# Patient Record
Sex: MALE | Race: OTHER | Employment: OTHER | URBAN - METROPOLITAN AREA
[De-identification: names, ages, dates, MRNs, and addresses within clinical notes are randomized per-mention and may not be internally consistent; named-entity substitution may affect disease eponyms.]

---

## 2021-08-29 ENCOUNTER — HOSPITAL ENCOUNTER (INPATIENT)
Age: 81
LOS: 5 days | Discharge: HOME | DRG: 863 | End: 2021-09-03
Attending: EMERGENCY MEDICINE | Admitting: FAMILY MEDICINE
Payer: MEDICARE

## 2021-08-29 ENCOUNTER — APPOINTMENT (OUTPATIENT)
Dept: CT IMAGING | Age: 81
DRG: 863 | End: 2021-08-29
Payer: MEDICARE

## 2021-08-29 DIAGNOSIS — N30.00 ACUTE CYSTITIS WITHOUT HEMATURIA: Primary | ICD-10-CM

## 2021-08-29 DIAGNOSIS — R33.9 URINARY RETENTION: ICD-10-CM

## 2021-08-29 PROBLEM — N39.0 UTI (URINARY TRACT INFECTION): Status: ACTIVE | Noted: 2021-08-29

## 2021-08-29 LAB
ALBUMIN SERPL-MCNC: 3.6 G/DL (ref 3.5–5.2)
ALP BLD-CCNC: 97 U/L (ref 40–129)
ALT SERPL-CCNC: 12 U/L (ref 0–40)
ANION GAP SERPL CALCULATED.3IONS-SCNC: 11 MMOL/L (ref 7–16)
AST SERPL-CCNC: 13 U/L (ref 0–39)
BACTERIA: ABNORMAL /HPF
BASOPHILS ABSOLUTE: 0.03 E9/L (ref 0–0.2)
BASOPHILS RELATIVE PERCENT: 0.3 % (ref 0–2)
BILIRUB SERPL-MCNC: 0.5 MG/DL (ref 0–1.2)
BILIRUBIN URINE: NEGATIVE
BLOOD, URINE: ABNORMAL
BUN BLDV-MCNC: 29 MG/DL (ref 6–23)
CALCIUM SERPL-MCNC: 9.5 MG/DL (ref 8.6–10.2)
CHLORIDE BLD-SCNC: 105 MMOL/L (ref 98–107)
CLARITY: CLEAR
CO2: 22 MMOL/L (ref 22–29)
COLOR: YELLOW
CREAT SERPL-MCNC: 1.9 MG/DL (ref 0.7–1.2)
EOSINOPHILS ABSOLUTE: 0.08 E9/L (ref 0.05–0.5)
EOSINOPHILS RELATIVE PERCENT: 0.9 % (ref 0–6)
GFR AFRICAN AMERICAN: 41
GFR NON-AFRICAN AMERICAN: 34 ML/MIN/1.73
GLUCOSE BLD-MCNC: 166 MG/DL (ref 74–99)
GLUCOSE URINE: NEGATIVE MG/DL
HCT VFR BLD CALC: 42.8 % (ref 37–54)
HEMOGLOBIN: 14 G/DL (ref 12.5–16.5)
IMMATURE GRANULOCYTES #: 0.04 E9/L
IMMATURE GRANULOCYTES %: 0.5 % (ref 0–5)
KETONES, URINE: NEGATIVE MG/DL
LACTIC ACID: 2 MMOL/L (ref 0.5–2.2)
LEUKOCYTE ESTERASE, URINE: ABNORMAL
LIPASE: 18 U/L (ref 13–60)
LYMPHOCYTES ABSOLUTE: 0.93 E9/L (ref 1.5–4)
LYMPHOCYTES RELATIVE PERCENT: 10.8 % (ref 20–42)
MCH RBC QN AUTO: 29.4 PG (ref 26–35)
MCHC RBC AUTO-ENTMCNC: 32.7 % (ref 32–34.5)
MCV RBC AUTO: 89.9 FL (ref 80–99.9)
MONOCYTES ABSOLUTE: 0.62 E9/L (ref 0.1–0.95)
MONOCYTES RELATIVE PERCENT: 7.2 % (ref 2–12)
NEUTROPHILS ABSOLUTE: 6.92 E9/L (ref 1.8–7.3)
NEUTROPHILS RELATIVE PERCENT: 80.3 % (ref 43–80)
NITRITE, URINE: NEGATIVE
PDW BLD-RTO: 14.9 FL (ref 11.5–15)
PH UA: 6 (ref 5–9)
PLATELET # BLD: 145 E9/L (ref 130–450)
PMV BLD AUTO: 11.3 FL (ref 7–12)
POTASSIUM SERPL-SCNC: 4.5 MMOL/L (ref 3.5–5)
PROTEIN UA: 30 MG/DL
RBC # BLD: 4.76 E12/L (ref 3.8–5.8)
RBC UA: ABNORMAL /HPF (ref 0–2)
SODIUM BLD-SCNC: 138 MMOL/L (ref 132–146)
SPECIFIC GRAVITY UA: 1.02 (ref 1–1.03)
TOTAL PROTEIN: 7.5 G/DL (ref 6.4–8.3)
UROBILINOGEN, URINE: 0.2 E.U./DL
WBC # BLD: 8.6 E9/L (ref 4.5–11.5)
WBC UA: ABNORMAL /HPF (ref 0–5)

## 2021-08-29 PROCEDURE — 87088 URINE BACTERIA CULTURE: CPT

## 2021-08-29 PROCEDURE — 96375 TX/PRO/DX INJ NEW DRUG ADDON: CPT

## 2021-08-29 PROCEDURE — 74176 CT ABD & PELVIS W/O CONTRAST: CPT

## 2021-08-29 PROCEDURE — 83690 ASSAY OF LIPASE: CPT

## 2021-08-29 PROCEDURE — 83605 ASSAY OF LACTIC ACID: CPT

## 2021-08-29 PROCEDURE — 1200000000 HC SEMI PRIVATE

## 2021-08-29 PROCEDURE — 6360000002 HC RX W HCPCS: Performed by: EMERGENCY MEDICINE

## 2021-08-29 PROCEDURE — 99283 EMERGENCY DEPT VISIT LOW MDM: CPT

## 2021-08-29 PROCEDURE — 85025 COMPLETE CBC W/AUTO DIFF WBC: CPT

## 2021-08-29 PROCEDURE — 96376 TX/PRO/DX INJ SAME DRUG ADON: CPT

## 2021-08-29 PROCEDURE — 80053 COMPREHEN METABOLIC PANEL: CPT

## 2021-08-29 PROCEDURE — 87186 SC STD MICRODIL/AGAR DIL: CPT

## 2021-08-29 PROCEDURE — 96374 THER/PROPH/DIAG INJ IV PUSH: CPT

## 2021-08-29 PROCEDURE — 87077 CULTURE AEROBIC IDENTIFY: CPT

## 2021-08-29 PROCEDURE — 2580000003 HC RX 258: Performed by: EMERGENCY MEDICINE

## 2021-08-29 PROCEDURE — 81001 URINALYSIS AUTO W/SCOPE: CPT

## 2021-08-29 RX ORDER — 0.9 % SODIUM CHLORIDE 0.9 %
500 INTRAVENOUS SOLUTION INTRAVENOUS ONCE
Status: COMPLETED | OUTPATIENT
Start: 2021-08-29 | End: 2021-08-29

## 2021-08-29 RX ORDER — ONDANSETRON 2 MG/ML
4 INJECTION INTRAMUSCULAR; INTRAVENOUS ONCE
Status: COMPLETED | OUTPATIENT
Start: 2021-08-29 | End: 2021-08-29

## 2021-08-29 RX ORDER — PROMETHAZINE HYDROCHLORIDE 25 MG/ML
12.5 INJECTION, SOLUTION INTRAMUSCULAR; INTRAVENOUS ONCE
Status: DISCONTINUED | OUTPATIENT
Start: 2021-08-29 | End: 2021-09-03 | Stop reason: HOSPADM

## 2021-08-29 RX ORDER — FENTANYL CITRATE 50 UG/ML
50 INJECTION, SOLUTION INTRAMUSCULAR; INTRAVENOUS ONCE
Status: COMPLETED | OUTPATIENT
Start: 2021-08-29 | End: 2021-08-29

## 2021-08-29 RX ADMIN — FENTANYL CITRATE 50 MCG: 50 INJECTION, SOLUTION INTRAMUSCULAR; INTRAVENOUS at 17:30

## 2021-08-29 RX ADMIN — ONDANSETRON 4 MG: 2 INJECTION INTRAMUSCULAR; INTRAVENOUS at 20:09

## 2021-08-29 RX ADMIN — ONDANSETRON 4 MG: 2 INJECTION INTRAMUSCULAR; INTRAVENOUS at 17:31

## 2021-08-29 RX ADMIN — SODIUM CHLORIDE 500 ML: 9 INJECTION, SOLUTION INTRAVENOUS at 17:31

## 2021-08-29 RX ADMIN — CEFTRIAXONE 1000 MG: 1 INJECTION, POWDER, FOR SOLUTION INTRAMUSCULAR; INTRAVENOUS at 21:47

## 2021-08-29 ASSESSMENT — PAIN SCALES - GENERAL: PAINLEVEL_OUTOF10: 6

## 2021-08-29 NOTE — ED NOTES
Bed: 15  Expected date:   Expected time:   Means of arrival:   Comments:  SINGH Mansfield RN  08/29/21 7448

## 2021-08-29 NOTE — ED PROVIDER NOTES
[facility]  Department of Emergency Medicine   ED  Encounter Note  Admit Date/RoomTime: 2021  4:38 PM  ED Room: 15/15    NAME: Arturo Marina  : 1940  MRN: [id number]     Chief Complaint:  Nausea & Vomiting (since this AM and hasn ot been able to take any of this medication. )    History of Present Illness        Arturo Marina is a 78 y.o. old male who presents to the emergency department for evaluation of nausea, vomiting and abdominal pain. Patient is Yi only speaking,  phone was used. This did limit history taking somewhat. He states that symptoms began today. He has been unable to take any of his medication for his diabetes. He denies any headache or falls. No chest pain or shortness of breath. No diarrhea or constipation. His daughter did arrive and did not provide any additional history. She agreed that the patient began having abdominal pain and vomiting today. Patient just moved here from [place] 1 week ago. He does not have a family doctor in the area. .  ROS   Pertinent positives and negatives are stated within HPI, all other systems reviewed and are negative. Past Medical History:  has no past medical history on file. Surgical History:  has no past surgical history on file. Social History:      Family History: family history is not on file. Allergies: Patient has no known allergies. Physical Exam   Oxygen Saturation Interpretation: Normal.        ED Triage Vitals [21 1639]   BP Temp Temp Source Pulse Resp SpO2 Height Weight   (!) 153/91 98.2 °F (36.8 °C) Oral 69 16 98 % -- --         General Appearance/Constitutional:  Alert, development consistent with age. HEENT:  NC/NT. PERRLA. Airway patent. Neck:  Supple. No lymphadenopathy. Respiratory: Lungs Clear to auscultation and breath sounds equal.  CV:  Regular rate and rhythm. GI:  normal appearing, non-distended with no visible hernias. Bowel sounds: normal bowel sounds. Tenderness: There is moderate tenderness present - located diffusely in the entire abdomen., There is no rebound tenderness. , There is no guarding. , There is no pulsatile mass. , Abdominal distension is present - located diffusely in the lower abdomen. Liver: non-tender. Spleen:  non-tender. Back: CVA Tenderness: No CVA tenderness. Integument:  Normal turgor. Warm, dry, without visible rash, unless noted elsewhere. Neurological:  Orientation age-appropriate. Motor functions intact.     Lab / Imaging Results   (All laboratory and radiology results have been personally reviewed by myself)  Labs:  Results for orders placed or performed during the hospital encounter of 08/29/21   Comprehensive Metabolic Panel   Result Value Ref Range    Sodium 138 132 - 146 mmol/L    Potassium 4.5 3.5 - 5.0 mmol/L    Chloride 105 98 - 107 mmol/L    CO2 22 22 - 29 mmol/L    Anion Gap 11 7 - 16 mmol/L    Glucose 166 (H) 74 - 99 mg/dL    BUN 29 (H) 6 - 23 mg/dL    CREATININE 1.9 (H) 0.7 - 1.2 mg/dL    GFR Non-African American 34 >=60 mL/min/1.73    GFR African American 41     Calcium 9.5 8.6 - 10.2 mg/dL    Total Protein 7.5 6.4 - 8.3 g/dL    Albumin 3.6 3.5 - 5.2 g/dL    Total Bilirubin 0.5 0.0 - 1.2 mg/dL    Alkaline Phosphatase 97 40 - 129 U/L    ALT 12 0 - 40 U/L    AST 13 0 - 39 U/L   CBC Auto Differential   Result Value Ref Range    WBC 8.6 4.5 - 11.5 E9/L    RBC 4.76 3.80 - 5.80 E12/L    Hemoglobin 14.0 12.5 - 16.5 g/dL    Hematocrit 42.8 37.0 - 54.0 %    MCV 89.9 80.0 - 99.9 fL    MCH 29.4 26.0 - 35.0 pg    MCHC 32.7 32.0 - 34.5 %    RDW 14.9 11.5 - 15.0 fL    Platelets 727 034 - 123 E9/L    MPV 11.3 7.0 - 12.0 fL    Neutrophils % 80.3 (H) 43.0 - 80.0 %    Immature Granulocytes % 0.5 0.0 - 5.0 %    Lymphocytes % 10.8 (L) 20.0 - 42.0 %    Monocytes % 7.2 2.0 - 12.0 %    Eosinophils % 0.9 0.0 - 6.0 %    Basophils % 0.3 0.0 - 2.0 % Neutrophils Absolute 6.92 1.80 - 7.30 E9/L    Immature Granulocytes # 0.04 E9/L    Lymphocytes Absolute 0.93 (L) 1.50 - 4.00 E9/L    Monocytes Absolute 0.62 0.10 - 0.95 E9/L    Eosinophils Absolute 0.08 0.05 - 0.50 E9/L    Basophils Absolute 0.03 0.00 - 0.20 E9/L   Lipase   Result Value Ref Range    Lipase 18 13 - 60 U/L   Urinalysis   Result Value Ref Range    Color, UA Yellow Straw/Yellow    Clarity, UA Clear Clear    Glucose, Ur Negative Negative mg/dL    Bilirubin Urine Negative Negative    Ketones, Urine Negative Negative mg/dL    Specific Gravity, UA 1.020 1.005 - 1.030    Blood, Urine TRACE (A) Negative    pH, UA 6.0 5.0 - 9.0    Protein, UA 30 (A) Negative mg/dL    Urobilinogen, Urine 0.2 <2.0 E.U./dL    Nitrite, Urine Negative Negative    Leukocyte Esterase, Urine SMALL (A) Negative   Lactic Acid, Plasma   Result Value Ref Range    Lactic Acid 2.0 0.5 - 2.2 mmol/L   Microscopic Urinalysis   Result Value Ref Range    WBC, UA 2-5 0 - 5 /HPF    RBC, UA 1-3 0 - 2 /HPF    Bacteria, UA MANY (A) None Seen /HPF     Imaging: All Radiology results interpreted by Radiologist unless otherwise noted. CT ABDOMEN PELVIS WO CONTRAST Additional Contrast? None   Final Result   1. No acute intraperitoneal retroperitoneal process in the abdomen or in the   pelvis. 2, status post TURP. Abnormal chronic findings in the bladder as above   commented, correlation with cystoscopy findings recommended. Presently there   is some distention of the bladder which could be an indication for urinary   bladder retention. Please correlate clinically.              ED Course / Medical Decision Making     Medications   promethazine (PHENERGAN) injection 12.5 mg (has no administration in time range)   cefTRIAXone (ROCEPHIN) 1,000 mg in sterile water 10 mL IV syringe (has no administration in time range)   0.9 % sodium chloride infusion (has no administration in time range)   insulin lispro (HUMALOG) injection vial 0-10 Units (has no administration in time range)   glucose (GLUTOSE) 40 % oral gel 15 g (has no administration in time range)   dextrose 50 % IV solution (has no administration in time range)   glucagon (rDNA) injection 1 mg (has no administration in time range)   dextrose 5 % solution (has no administration in time range)   sodium chloride flush 0.9 % injection 5-40 mL (has no administration in time range)   sodium chloride flush 0.9 % injection 5-40 mL (has no administration in time range)   0.9 % sodium chloride infusion (has no administration in time range)   ondansetron (ZOFRAN-ODT) disintegrating tablet 4 mg (has no administration in time range)     Or   ondansetron (ZOFRAN) injection 4 mg (has no administration in time range)   acetaminophen (TYLENOL) tablet 650 mg (has no administration in time range)     Or   acetaminophen (TYLENOL) suppository 650 mg (has no administration in time range)   polyethylene glycol (GLYCOLAX) packet 17 g (has no administration in time range)   heparin (porcine) injection 5,000 Units (has no administration in time range)   morphine (PF) injection 2 mg (has no administration in time range)   busPIRone (BUSPAR) tablet 5 mg (has no administration in time range)   hydrALAZINE (APRESOLINE) injection 5 mg (has no administration in time range)   NIFEdipine (PROCARDIA XL) extended release tablet 30 mg (has no administration in time range)   0.9 % sodium chloride bolus (0 mLs IntraVENous Stopped 8/29/21 1809)   ondansetron (ZOFRAN) injection 4 mg (4 mg IntraVENous Given 8/29/21 1731)   fentaNYL (SUBLIMAZE) injection 50 mcg (50 mcg IntraVENous Given 8/29/21 1730)   ondansetron (ZOFRAN) injection 4 mg (4 mg IntraVENous Given 8/29/21 2009)   cefTRIAXone (ROCEPHIN) 1,000 mg in sterile water 10 mL IV syringe (1,000 mg IntraVENous New Bag 8/29/21 2147)        Re-Evaluations:  8/29/21      Patients condition is improving.     Consultations:             IP CONSULT TO INTERNAL MEDICINE  IP CONSULT TO

## 2021-08-30 PROBLEM — E11.29 TYPE 2 DIABETES MELLITUS WITH RENAL COMPLICATION (HCC): Status: ACTIVE | Noted: 2021-08-30

## 2021-08-30 PROBLEM — R11.2 INTRACTABLE NAUSEA AND VOMITING: Status: ACTIVE | Noted: 2021-08-30

## 2021-08-30 PROBLEM — N17.9 ACUTE KIDNEY INJURY SUPERIMPOSED ON CKD (HCC): Status: ACTIVE | Noted: 2021-08-30

## 2021-08-30 PROBLEM — N18.9 ACUTE KIDNEY INJURY SUPERIMPOSED ON CKD (HCC): Status: ACTIVE | Noted: 2021-08-30

## 2021-08-30 PROBLEM — N40.0 BPH (BENIGN PROSTATIC HYPERPLASIA): Status: ACTIVE | Noted: 2021-08-30

## 2021-08-30 PROBLEM — I10 ESSENTIAL HYPERTENSION: Status: ACTIVE | Noted: 2021-08-30

## 2021-08-30 PROBLEM — F41.9 ANXIETY: Status: ACTIVE | Noted: 2021-08-30

## 2021-08-30 PROBLEM — E86.0 DEHYDRATION, MODERATE: Status: ACTIVE | Noted: 2021-08-30

## 2021-08-30 PROBLEM — R14.0 ABDOMINAL DISTENTION: Status: ACTIVE | Noted: 2021-08-30

## 2021-08-30 LAB
ANION GAP SERPL CALCULATED.3IONS-SCNC: 12 MMOL/L (ref 7–16)
BASOPHILS ABSOLUTE: 0.02 E9/L (ref 0–0.2)
BASOPHILS RELATIVE PERCENT: 0.3 % (ref 0–2)
BUN BLDV-MCNC: 25 MG/DL (ref 6–23)
CALCIUM SERPL-MCNC: 9.3 MG/DL (ref 8.6–10.2)
CHLORIDE BLD-SCNC: 105 MMOL/L (ref 98–107)
CK MB: 5.7 NG/ML (ref 0–7.7)
CO2: 19 MMOL/L (ref 22–29)
CREAT SERPL-MCNC: 1.5 MG/DL (ref 0.7–1.2)
D DIMER: 1386 NG/ML DDU
EKG ATRIAL RATE: 50 BPM
EKG P AXIS: 34 DEGREES
EKG P-R INTERVAL: 226 MS
EKG Q-T INTERVAL: 436 MS
EKG QRS DURATION: 98 MS
EKG QTC CALCULATION (BAZETT): 397 MS
EKG R AXIS: 38 DEGREES
EKG T AXIS: 53 DEGREES
EKG VENTRICULAR RATE: 50 BPM
EOSINOPHILS ABSOLUTE: 0.12 E9/L (ref 0.05–0.5)
EOSINOPHILS RELATIVE PERCENT: 1.5 % (ref 0–6)
GFR AFRICAN AMERICAN: 54
GFR NON-AFRICAN AMERICAN: 45 ML/MIN/1.73
GLUCOSE BLD-MCNC: 97 MG/DL (ref 74–99)
HBA1C MFR BLD: 5.5 % (ref 4–5.6)
HCT VFR BLD CALC: 45.4 % (ref 37–54)
HEMOGLOBIN: 14.6 G/DL (ref 12.5–16.5)
IMMATURE GRANULOCYTES #: 0.02 E9/L
IMMATURE GRANULOCYTES %: 0.3 % (ref 0–5)
LIPASE: 16 U/L (ref 13–60)
LYMPHOCYTES ABSOLUTE: 0.98 E9/L (ref 1.5–4)
LYMPHOCYTES RELATIVE PERCENT: 12.5 % (ref 20–42)
MCH RBC QN AUTO: 29.8 PG (ref 26–35)
MCHC RBC AUTO-ENTMCNC: 32.2 % (ref 32–34.5)
MCV RBC AUTO: 92.7 FL (ref 80–99.9)
METER GLUCOSE: 101 MG/DL (ref 74–99)
METER GLUCOSE: 81 MG/DL (ref 74–99)
METER GLUCOSE: 89 MG/DL (ref 74–99)
METER GLUCOSE: 90 MG/DL (ref 74–99)
MONOCYTES ABSOLUTE: 0.58 E9/L (ref 0.1–0.95)
MONOCYTES RELATIVE PERCENT: 7.4 % (ref 2–12)
NEUTROPHILS ABSOLUTE: 6.14 E9/L (ref 1.8–7.3)
NEUTROPHILS RELATIVE PERCENT: 78 % (ref 43–80)
PDW BLD-RTO: 14.8 FL (ref 11.5–15)
PLATELET # BLD: 123 E9/L (ref 130–450)
PMV BLD AUTO: 12.1 FL (ref 7–12)
POTASSIUM REFLEX MAGNESIUM: 4.4 MMOL/L (ref 3.5–5)
PROCALCITONIN: 0.07 NG/ML (ref 0–0.08)
PROSTATE SPECIFIC ANTIGEN: 0.84 NG/ML (ref 0–4)
RBC # BLD: 4.9 E12/L (ref 3.8–5.8)
SARS-COV-2, NAAT: NOT DETECTED
SODIUM BLD-SCNC: 136 MMOL/L (ref 132–146)
TOTAL CK: 52 U/L (ref 20–200)
TROPONIN, HIGH SENSITIVITY: 14 NG/L (ref 0–11)
TROPONIN, HIGH SENSITIVITY: 15 NG/L (ref 0–11)
WBC # BLD: 7.9 E9/L (ref 4.5–11.5)

## 2021-08-30 PROCEDURE — 6370000000 HC RX 637 (ALT 250 FOR IP): Performed by: FAMILY MEDICINE

## 2021-08-30 PROCEDURE — 2580000003 HC RX 258: Performed by: FAMILY MEDICINE

## 2021-08-30 PROCEDURE — 36415 COLL VENOUS BLD VENIPUNCTURE: CPT

## 2021-08-30 PROCEDURE — 80048 BASIC METABOLIC PNL TOTAL CA: CPT

## 2021-08-30 PROCEDURE — 1200000000 HC SEMI PRIVATE

## 2021-08-30 PROCEDURE — 93005 ELECTROCARDIOGRAM TRACING: CPT | Performed by: FAMILY MEDICINE

## 2021-08-30 PROCEDURE — 85378 FIBRIN DEGRADE SEMIQUANT: CPT

## 2021-08-30 PROCEDURE — G0103 PSA SCREENING: HCPCS

## 2021-08-30 PROCEDURE — C9113 INJ PANTOPRAZOLE SODIUM, VIA: HCPCS | Performed by: FAMILY MEDICINE

## 2021-08-30 PROCEDURE — 6370000000 HC RX 637 (ALT 250 FOR IP): Performed by: NURSE PRACTITIONER

## 2021-08-30 PROCEDURE — 6360000002 HC RX W HCPCS: Performed by: FAMILY MEDICINE

## 2021-08-30 PROCEDURE — 84484 ASSAY OF TROPONIN QUANT: CPT

## 2021-08-30 PROCEDURE — 87040 BLOOD CULTURE FOR BACTERIA: CPT

## 2021-08-30 PROCEDURE — 85025 COMPLETE CBC W/AUTO DIFF WBC: CPT

## 2021-08-30 PROCEDURE — 82553 CREATINE MB FRACTION: CPT

## 2021-08-30 PROCEDURE — 83690 ASSAY OF LIPASE: CPT

## 2021-08-30 PROCEDURE — 82962 GLUCOSE BLOOD TEST: CPT

## 2021-08-30 PROCEDURE — 87635 SARS-COV-2 COVID-19 AMP PRB: CPT

## 2021-08-30 PROCEDURE — 82550 ASSAY OF CK (CPK): CPT

## 2021-08-30 PROCEDURE — 84145 PROCALCITONIN (PCT): CPT

## 2021-08-30 PROCEDURE — 83036 HEMOGLOBIN GLYCOSYLATED A1C: CPT

## 2021-08-30 RX ORDER — HEPARIN SODIUM 10000 [USP'U]/ML
5000 INJECTION, SOLUTION INTRAVENOUS; SUBCUTANEOUS EVERY 8 HOURS
Status: DISCONTINUED | OUTPATIENT
Start: 2021-08-30 | End: 2021-09-03 | Stop reason: HOSPADM

## 2021-08-30 RX ORDER — SODIUM CHLORIDE 9 MG/ML
INJECTION, SOLUTION INTRAVENOUS CONTINUOUS
Status: DISCONTINUED | OUTPATIENT
Start: 2021-08-30 | End: 2021-09-03 | Stop reason: HOSPADM

## 2021-08-30 RX ORDER — SODIUM CHLORIDE 9 MG/ML
25 INJECTION, SOLUTION INTRAVENOUS PRN
Status: DISCONTINUED | OUTPATIENT
Start: 2021-08-30 | End: 2021-09-03 | Stop reason: HOSPADM

## 2021-08-30 RX ORDER — ONDANSETRON 4 MG/1
4 TABLET, ORALLY DISINTEGRATING ORAL EVERY 8 HOURS PRN
Status: DISCONTINUED | OUTPATIENT
Start: 2021-08-30 | End: 2021-09-03 | Stop reason: HOSPADM

## 2021-08-30 RX ORDER — NICOTINE POLACRILEX 4 MG
15 LOZENGE BUCCAL PRN
Status: DISCONTINUED | OUTPATIENT
Start: 2021-08-30 | End: 2021-09-03 | Stop reason: HOSPADM

## 2021-08-30 RX ORDER — ACETAMINOPHEN 650 MG/1
650 SUPPOSITORY RECTAL EVERY 6 HOURS PRN
Status: DISCONTINUED | OUTPATIENT
Start: 2021-08-30 | End: 2021-09-03 | Stop reason: HOSPADM

## 2021-08-30 RX ORDER — ONDANSETRON 2 MG/ML
4 INJECTION INTRAMUSCULAR; INTRAVENOUS EVERY 6 HOURS PRN
Status: DISCONTINUED | OUTPATIENT
Start: 2021-08-30 | End: 2021-09-03 | Stop reason: HOSPADM

## 2021-08-30 RX ORDER — SODIUM CHLORIDE 0.9 % (FLUSH) 0.9 %
5-40 SYRINGE (ML) INJECTION EVERY 12 HOURS SCHEDULED
Status: DISCONTINUED | OUTPATIENT
Start: 2021-08-30 | End: 2021-09-03 | Stop reason: HOSPADM

## 2021-08-30 RX ORDER — ACETAMINOPHEN 325 MG/1
650 TABLET ORAL EVERY 6 HOURS PRN
Status: DISCONTINUED | OUTPATIENT
Start: 2021-08-30 | End: 2021-09-03 | Stop reason: HOSPADM

## 2021-08-30 RX ORDER — PANTOPRAZOLE SODIUM 40 MG/10ML
40 INJECTION, POWDER, LYOPHILIZED, FOR SOLUTION INTRAVENOUS DAILY
Status: DISCONTINUED | OUTPATIENT
Start: 2021-08-30 | End: 2021-09-03 | Stop reason: HOSPADM

## 2021-08-30 RX ORDER — MORPHINE SULFATE 2 MG/ML
2 INJECTION, SOLUTION INTRAMUSCULAR; INTRAVENOUS EVERY 4 HOURS PRN
Status: DISCONTINUED | OUTPATIENT
Start: 2021-08-30 | End: 2021-09-03 | Stop reason: HOSPADM

## 2021-08-30 RX ORDER — DEXTROSE MONOHYDRATE 50 MG/ML
100 INJECTION, SOLUTION INTRAVENOUS PRN
Status: DISCONTINUED | OUTPATIENT
Start: 2021-08-30 | End: 2021-09-03 | Stop reason: HOSPADM

## 2021-08-30 RX ORDER — HYDRALAZINE HYDROCHLORIDE 20 MG/ML
5 INJECTION INTRAMUSCULAR; INTRAVENOUS EVERY 6 HOURS PRN
Status: DISCONTINUED | OUTPATIENT
Start: 2021-08-30 | End: 2021-09-03 | Stop reason: HOSPADM

## 2021-08-30 RX ORDER — TAMSULOSIN HYDROCHLORIDE 0.4 MG/1
0.4 CAPSULE ORAL DAILY
Status: DISCONTINUED | OUTPATIENT
Start: 2021-08-30 | End: 2021-09-03 | Stop reason: HOSPADM

## 2021-08-30 RX ORDER — NIFEDIPINE 30 MG/1
30 TABLET, EXTENDED RELEASE ORAL DAILY
Status: DISCONTINUED | OUTPATIENT
Start: 2021-08-30 | End: 2021-08-31

## 2021-08-30 RX ORDER — BUSPIRONE HYDROCHLORIDE 5 MG/1
5 TABLET ORAL 2 TIMES DAILY
Status: DISCONTINUED | OUTPATIENT
Start: 2021-08-30 | End: 2021-09-03 | Stop reason: HOSPADM

## 2021-08-30 RX ORDER — POLYETHYLENE GLYCOL 3350 17 G/17G
17 POWDER, FOR SOLUTION ORAL DAILY PRN
Status: DISCONTINUED | OUTPATIENT
Start: 2021-08-30 | End: 2021-09-03 | Stop reason: HOSPADM

## 2021-08-30 RX ORDER — DEXTROSE MONOHYDRATE 25 G/50ML
12.5 INJECTION, SOLUTION INTRAVENOUS PRN
Status: DISCONTINUED | OUTPATIENT
Start: 2021-08-30 | End: 2021-09-03 | Stop reason: HOSPADM

## 2021-08-30 RX ORDER — SODIUM CHLORIDE 0.9 % (FLUSH) 0.9 %
5-40 SYRINGE (ML) INJECTION PRN
Status: DISCONTINUED | OUTPATIENT
Start: 2021-08-30 | End: 2021-09-03 | Stop reason: HOSPADM

## 2021-08-30 RX ADMIN — HEPARIN SODIUM 5000 UNITS: 10000 INJECTION INTRAVENOUS; SUBCUTANEOUS at 07:04

## 2021-08-30 RX ADMIN — BUSPIRONE HYDROCHLORIDE 5 MG: 5 TABLET ORAL at 22:41

## 2021-08-30 RX ADMIN — SODIUM CHLORIDE: 9 INJECTION, SOLUTION INTRAVENOUS at 14:23

## 2021-08-30 RX ADMIN — BUSPIRONE HYDROCHLORIDE 5 MG: 5 TABLET ORAL at 08:31

## 2021-08-30 RX ADMIN — HEPARIN SODIUM 5000 UNITS: 10000 INJECTION INTRAVENOUS; SUBCUTANEOUS at 22:42

## 2021-08-30 RX ADMIN — TAMSULOSIN HYDROCHLORIDE 0.4 MG: 0.4 CAPSULE ORAL at 17:32

## 2021-08-30 RX ADMIN — NIFEDIPINE 30 MG: 30 TABLET, EXTENDED RELEASE ORAL at 08:31

## 2021-08-30 RX ADMIN — WATER 1000 MG: 1 INJECTION INTRAMUSCULAR; INTRAVENOUS; SUBCUTANEOUS at 22:41

## 2021-08-30 RX ADMIN — SODIUM CHLORIDE: 9 INJECTION, SOLUTION INTRAVENOUS at 03:35

## 2021-08-30 RX ADMIN — PANTOPRAZOLE SODIUM 40 MG: 40 INJECTION, POWDER, FOR SOLUTION INTRAVENOUS at 03:35

## 2021-08-30 ASSESSMENT — PAIN SCALES - GENERAL
PAINLEVEL_OUTOF10: 0

## 2021-08-30 NOTE — CONSULTS
8/30/2021 3:33 PM  Service: Urology  Group: [facility] ([facility])    Norma Arceo  [id number]     Chief Complaint:  Pain on urination    History of Present Illness: The patient is a 78 y.o. male with a PMH of prediabetes, BPH, CKD, hypertension, gout and anxiety disorder who presents with abdominal pain, nausea and vomiting. Pt is difficult to obtain a hx from as  phone is not available. I called his daughter Kal Mobley [phone] who told me pt had a TURP in March in [place]. He as doing well until yesterday when he had pain in his \"private parts\". He was having no problems urinating. He did have a little constipation. No hematuria. He did have a de leon catheter placed for unknown amount. He currently appears comfortable and is asking to eat. This is confirmed by his daughter. Past Medical History:   Diagnosis Date    Pre-diabetes          No past surgical history on file. Medications Prior to Admission:    No medications prior to admission. Allergies:    Patient has no known allergies. Social History:    Lives with his daughter. Just moved here from [place]    Family History:   Non-contributory to this Urological problem  family history is not on file. Review of Systems:  Unable to obtain    Physical Exam:     Vitals:  BP (!) 152/74   Pulse 55   Temp 97.5 °F (36.4 °C) (Temporal)   Resp 16   Ht 5' 6\" (1.676 m)   SpO2 95%     General:  Awake, alert, conversing  HEENT:  Normocephalic, atraumatic. Pupils equal, round. No scleral icterus. No conjunctival injection. Normal lips, teeth, and gums. No nasal dis  Heart:  RRR  Lungs:  No audible wheezing. Respirations symmetric and non-labored. Abdomen:  soft, nontender, no masses, no organomegaly, no peritoneal signs  Extremities:  No clubbing, cyanosis, or edema  Skin:  Warm and dry, no open lesions or rashes  Neuro:  There are no motor or sensory deficits in the 4 quadrant extremities  Genitalia: de leon catheter noted in urethra  Labs:  Recent Labs     08/29/21  1715 08/30/21  0449   WBC 8.6 7.9   RBC 4.76 4.90   HGB 14.0 14.6   HCT 42.8 45.4   MCV 89.9 92.7   MCH 29.4 29.8   MCHC 32.7 32.2   RDW 14.9 14.8    123*   MPV 11.3 12.1*         Recent Labs     08/29/21  1715 08/30/21  0449   CREATININE 1.9* 1.5*       Results for Carola Akbar (MRN [id number]) as of 8/30/2021 15:51   Ref. Range 8/29/2021 19:10   Color, UA Latest Ref Range: Straw/Yellow  Yellow   Clarity, UA Latest Ref Range: Clear  Clear   Glucose, UA Latest Ref Range: Negative mg/dL Negative   Bilirubin, Urine Latest Ref Range: Negative  Negative   Ketones, Urine Latest Ref Range: Negative mg/dL Negative   Specific Gravity, UA Latest Ref Range: 1.005 - 1.030  1.020   Blood, Urine Latest Ref Range: Negative  TRACE (A)   pH, UA Latest Ref Range: 5.0 - 9.0  6.0   Protein, UA Latest Ref Range: Negative mg/dL 30 (A)   Urobilinogen, Urine Latest Ref Range: <2.0 E.U./dL 0.2   Nitrite, Urine Latest Ref Range: Negative  Negative   Leukocyte Esterase, Urine Latest Ref Range: Negative  SMALL (A)   WBC, UA Latest Ref Range: 0 - 5 /HPF 2-5   RBC, UA Latest Ref Range: 0 - 2 /HPF 1-3   Bacteria, UA Latest Ref Range: None Seen /HPF MANY (A)     8/30/2021 12:04 PM - Colt, Frieda Incoming Results From [facility]    Specimen Information: Urine, clean catch        Component Collected Lab   Organism Abnormal  08/29/2021  7:10 PM [facility]   Gram positive cocci    Urine Culture, Routine 08/29/2021  7:10 PM [facility]   >100,000 CFU/ml   Identification and sensitivity to follow          Assessment/plan:  BPH, REID  UTI   UR    78year old male s/p TURP who presents with abdominal pain  Nausea, vomiting. He had a de leon catheter inserted, unclear if he was in retention although the bladder looked somewhat distended on ct scan with chronic changes of REID noted. His daughter states he was voiding ok at home.   Urine + for gram positive cocci  Final culture pending  Antibiotics per primary  Start Flomax  PSA pending  Will plan VT prior to discharge  Updated daughter    Thank you  Frank RAYMOND  [facility]                  Electronically signed by Merlin Major, APRN - CNP on 8/30/2021 at 3:33 PM   Agree with above assessment and plan

## 2021-08-30 NOTE — CARE COORDINATION
Pt is Malagasy-speaking. Placed call to daughter, Bernice Erickson, to determine discharge plan. Message left.  Jan Werner -[phone]

## 2021-08-30 NOTE — ED NOTES
used, number [id number], patient verbalized understanding of medications and insertion of de leon     Genesis Zavala RN  08/29/21 2010

## 2021-08-30 NOTE — ED NOTES
Daughter Rimma Hassan called and said to call her if pt needs a ride home [phone]     Maribell Pelayo  08/29/21 5952

## 2021-08-30 NOTE — H&P
Hospital Medicine History & Physical      PCP: No primary care provider on file. Date of Admission: 8/29/2021    Date of Service: Pt seen/examined on 8/29/2021 and Admitted to Inpatient with expected LOS greater than two midnights due to medical therapy. Chief Complaint: Abdominal pain, nausea and vomiting      History Of Present Illness:      78 y.o. male who presented to [facility] with medical history of prediabetes/diabetes, BPH status post TURP, chronic kidney disease, unknown baseline, hypertension, gout and anxiety disorder. Patient is new to the area in the past 1 week, lives in [place] where he has received most of his care. Patient is a poor historian and primarily Monegasque-speaking. History provided partially by  and his daughter over the phone.  system broke down in the middle of conversation. According to patient's daughter who takes care of him, he was in his usual state of health until sometime today when he developed sudden onset of abdominal pain. Could not characterize the pain for me over the phone, states that it is constant and patient has had associated abdominal distention, nausea and vomiting. No bladder or bowel complaints. No fever noticed by then. Patient continues to throw up in the ER. Not vaccinated for Covid. Vital signs notable for blood pressure 153/91, labs showed urinalysis with 2-5 white cells, positive for bacteria, leukocyte esterase, creatinine 1.9, unknown baseline, glucose 166. CT scan of the abdomen and pelvis showed normal pancreas and gallbladder, kidney cysts, distended bladder with postop TURP changes, bladder wall thickening with several calcifications scattered to the bladder wall, bladder diverticuli and colonic diverticulosis. 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 DJD of the spine.     History taking is limited due to language barrier and malfunctioning  system. He is being admitted for further management. Past Medical History: Above    No past medical history on file. Past Surgical History: TURP in 2021    No past surgical history on file. Medications Prior to Admission: Med list reviewed over the phone, losartan 25 mg daily, allopurinol 100 mg daily, glimepiride 1 mg daily, nifedipine 30 mg daily and buspirone 5 mg twice daily. Prior to Admission medications    Not on File       Allergies:  Patient has no known allergies. Social History:      The patient currently lives at home with daughter. TOBACCO:   has no history on file for tobacco use. ETOH:   has no history on file for alcohol use. Family History:     Reviewed in detail Positive as follows: Mother  from complication of heart disease, uncle had diabetes. No family history on file. REVIEW OF SYSTEMS:   Pertinent positives as noted in the HPI. Unable to review due to language barrier. PHYSICAL EXAM:    BP (!) 148/85   Pulse 75   Temp 98.2 °F (36.8 °C) (Oral)   Resp 20   SpO2 98%     General appearance: Elderly male, ill-appearing and weak, mild painful distress, appears stated age and cooperative. Afebrile. HEENT:  Normal cephalic, atraumatic without obvious deformity. Pupils equal, round, and reactive to light. Extra ocular muscles intact. Conjunctivae/corneas clear. Neck: Supple, with full range of motion. No jugular venous distention. Trachea midline. Respiratory:  Normal respiratory effort. Clear to auscultation, bilaterally without Rales/Wheezes/Rhonchi. Cardiovascular:  Regular rate and rhythm with normal S1/S2 without murmurs, rubs or gallops. Abdomen: Soft, distended and tender with diminished bowel sounds. Musculoskeletal:  No clubbing, cyanosis or edema bilaterally. Full range of motion without deformity. Skin: Skin color, texture, turgor normal.  No rashes or lesions.   Neurologic: Cranial nerves: II-XII intact, grossly non-focal.  Psychiatric:  Alert and oriented, thought content appropriate, normal insight  Capillary Refill: Brisk,< 3 seconds   Peripheral Pulses: +2 palpable, equal bilaterally       Labs:     Recent Labs     08/29/21  1715   WBC 8.6   HGB 14.0   HCT 42.8        Recent Labs     08/29/21  1715      K 4.5      CO2 22   BUN 29*   CREATININE 1.9*   CALCIUM 9.5     Recent Labs     08/29/21  1715   AST 13   ALT 12   BILITOT 0.5   ALKPHOS 97     No results for input(s): INR in the last 72 hours. No results for input(s): Vangie Keens in the last 72 hours. Urinalysis:      Lab Results   Component Value Date    NITRU Negative 08/29/2021    WBCUA 2-5 08/29/2021    BACTERIA MANY 08/29/2021    RBCUA 1-3 08/29/2021    BLOODU TRACE 08/29/2021    SPECGRAV 1.020 08/29/2021    GLUCOSEU Negative 08/29/2021       Radiology:   Reviewed and documented    CT ABDOMEN PELVIS WO CONTRAST Additional Contrast? None   Final Result   1. No acute intraperitoneal retroperitoneal process in the abdomen or in the   pelvis. 2, status post TURP. Abnormal chronic findings in the bladder as above   commented, correlation with cystoscopy findings recommended. Presently there   is some distention of the bladder which could be an indication for urinary   bladder retention. Please correlate clinically. 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 Urinary retention with bladder calcifications  .   Abdominal pain    PLAN:  1.  UTI, IV Rocephin, urine and blood culture. 2.  Urinary retention, had Duval placed in the ER. Minimal urine retrieved. urology consult. 3.  Distended bladder with calcifications. Urology consult. 4.  Intractable nausea and vomiting secondary to ongoing intra-abdominal process. Supportive care. PPI, EKG a Covid test.  5.  RIVER on CKD, patient has baseline CKD unknown creatinine level. Suspected acute component due to intractable nausea and vomiting. Give fluids and monitor urinary output and kidney function. Hold losartan. Avoid nephrotoxic agents. 6.  Type 2 diabetes with renal complication, sliding scale coverage, monitor blood sugar. 7.  History of BPH status post TURP  8. Moderate dehydration, IV fluids  9. Anxiety disorder continue home medications. 10.  Hypertension, blood pressure is elevated, hold losartan given renal failure. Continue nifedipine. DVT Prophylaxis: Heparin  Diet: Diet NPO Exceptions are: Sips of Water with Meds  Code Status: No Order full    PT/OT Eval Status: As needed    Dispo -inpatient/MedSurg. Rebekah Fabry, MD    Thank you No primary care provider on file. for the opportunity to be involved in this patient's care.

## 2021-08-30 NOTE — PROGRESS NOTES
Hospitalist progress note    Patient:  Woody Figueredo  YOB: 1940  Date of Service: 8/30/21  MRN: [id number]   Acct:  [de-identified]   Primary Care Physician: No primary care provider on file. 8/29/2021     Patient Seen, Chart, Consults notes, Labs, Radiology studies reviewed. 78years old male patient came to emergency room because of abdominal pain nausea and vomiting patient has history of prediabetes and posterior pancreatic kidney disease. Patient was diagnosed with UTI and he was admitted to the hospital      Chief Complaint: None    Subjective     Patient speaks Pashto and I brought . There is no fever, or chills or sweating. There is no abdominal pain, change in bowel habits, or black or bloody stools, there is no upper GI bleeding, no nausea, no vomiting, or jaundice. The patient denied any chest pain, shortness of breath , palpitations, or irregular heart beats     Review of systems:    All 10 review of system were negative unless what is mentioned in the present history               PHYSICAL EXAM:  BP (!) 148/85   Pulse 75   Temp 98.2 °F (36.8 °C) (Oral)   Resp 20   Ht 5' 6\" (1.676 m)   SpO2 98%     General appearance - alert, well appearing, and in no distress   Head: atraumatic   Pupil: equal, round reactive to light   Neck: Supple, trachea is midline   Chest - clear to auscultation, no wheezes, rales or rhonchi, symmetric air entry   Distant Breath Sounds: No   Heart - S1 and S2 normal   Abdomen - soft, nontender, nondistended, no masses or organomegaly   Obese: No; Protuberant: Yes   normal without focal findings, mental status, speech normal, alert and oriented x3, CHARLIE    Integumentary - Skin color, texture, turgor normal. No rashes or lesions.    Musculoskeletal - no joint tenderness, deformity or swelling   Extremities - peripheral pulses normal, no pedal edema, no clubbing or cyanosis times 4        Review of Labs and Diagnostic Testing:         CT ABDOMEN PELVIS WO CONTRAST Additional Contrast? None    Result Date: 8/29/2021  EXAMINATION: CT OF THE ABDOMEN AND PELVIS WITHOUT CONTRAST 8/29/2021 6:35 pm TECHNIQUE: CT of the abdomen and pelvis was performed without the administration of intravenous contrast. Multiplanar reformatted images are provided for review. Dose modulation, iterative reconstruction, and/or weight based adjustment of the mA/kV was utilized to reduce the radiation dose to as low as reasonably achievable. HISTORY: ORDERING SYSTEM PROVIDED HISTORY: abdominal pain TECHNOLOGIST PROVIDED HISTORY: Reason for exam:->abdominal pain Additional Contrast?->None Decision Support Exception - unselect if not a suspected or confirmed emergency medical condition->Emergency Medical Condition (MA) What reading provider will be dictating this exam?->CRC FINDINGS: There is normal size and density for the liver, spleen. Pancreas has some degree of fat replacement. The gallbladder is normally distended. The biliary tree and pancreatic ductal systems are not dilated. Adrenals not enlarged. Kidneys have preserved size and cortical thickness. The simple cysts seen in the right kidney. Simple cysts seen in the left kidney. There is no renal calculus or obstruction. The bladder is moderate distended the, it measures 12 by 8.3 by 8 cm. The patient had previous TURP. There is thickening of the bladder wall up to 10 mm. Several calcifications are seen scattered throughout the bladder wall. One of these bladder wall calcifications appears to be in a collapse the bladder diverticula in the left lateral wall of the bladder. There is a septated appearance for areas of thickening of the bladder wall which could be infiltration by a myriad of multiple small diverticular like formation dissecting through the bladder wall, however correlation with cystoscopy findings are recommended. These are chronic findings of the bladder. Seminal vesicles symmetrical size.  There are no acute inflammatory changes in the mid mesentery fat planes, free intraperitoneal air ascites or bowel obstruction. The appendix seen and has unremarkable appearance. Scattered diverticular formations seen throughout the colon there is no signs for acute diverticulitis. Diverticulosis more severe in the sigmoid colon. Aorta and IVC appear unremarkable. There is no midline retroperitoneal adenopathy. . 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 Rosalio Hutton Please correlate clinically. Degenerative changes are seen throughout the thoracolumbar spine relate with spondylosis in moderate degree. 1.  No acute intraperitoneal retroperitoneal process in the abdomen or in the pelvis. 2, status post TURP. Abnormal chronic findings in the bladder as above commented, correlation with cystoscopy findings recommended. Presently there is some distention of the bladder which could be an indication for urinary bladder retention. Please correlate clinically.      Recent Results (from the past 24 hour(s))   Comprehensive Metabolic Panel    Collection Time: 08/29/21  5:15 PM   Result Value Ref Range    Sodium 138 132 - 146 mmol/L    Potassium 4.5 3.5 - 5.0 mmol/L    Chloride 105 98 - 107 mmol/L    CO2 22 22 - 29 mmol/L    Anion Gap 11 7 - 16 mmol/L    Glucose 166 (H) 74 - 99 mg/dL    BUN 29 (H) 6 - 23 mg/dL    CREATININE 1.9 (H) 0.7 - 1.2 mg/dL    GFR Non-African American 34 >=60 mL/min/1.73    GFR African American 41     Calcium 9.5 8.6 - 10.2 mg/dL    Total Protein 7.5 6.4 - 8.3 g/dL    Albumin 3.6 3.5 - 5.2 g/dL    Total Bilirubin 0.5 0.0 - 1.2 mg/dL    Alkaline Phosphatase 97 40 - 129 U/L    ALT 12 0 - 40 U/L    AST 13 0 - 39 U/L   CBC Auto Differential    Collection Time: 08/29/21  5:15 PM   Result Value Ref Range    WBC 8.6 4.5 - 11.5 E9/L    RBC 4.76 3.80 - 5.80 E12/L    Hemoglobin 14.0 12.5 - 16.5 g/dL    Hematocrit 42.8 37.0 - 54.0 %    MCV 89.9 80.0 - 99.9 fL    MCH 29.4 26.0 - 35.0 pg    MCHC 32.7 32.0 - 34.5 %    RDW 14.9 11.5 - 15.0 fL    Platelets 580 515 - 090 E9/L    MPV 11.3 7.0 - 12.0 fL    Neutrophils % 80.3 (H) 43.0 - 80.0 %    Immature Granulocytes % 0.5 0.0 - 5.0 %    Lymphocytes % 10.8 (L) 20.0 - 42.0 %    Monocytes % 7.2 2.0 - 12.0 %    Eosinophils % 0.9 0.0 - 6.0 %    Basophils % 0.3 0.0 - 2.0 %    Neutrophils Absolute 6.92 1.80 - 7.30 E9/L    Immature Granulocytes # 0.04 E9/L    Lymphocytes Absolute 0.93 (L) 1.50 - 4.00 E9/L    Monocytes Absolute 0.62 0.10 - 0.95 E9/L    Eosinophils Absolute 0.08 0.05 - 0.50 E9/L    Basophils Absolute 0.03 0.00 - 0.20 E9/L   Lipase    Collection Time: 08/29/21  5:15 PM   Result Value Ref Range    Lipase 18 13 - 60 U/L   Lactic Acid, Plasma    Collection Time: 08/29/21  5:15 PM   Result Value Ref Range    Lactic Acid 2.0 0.5 - 2.2 mmol/L   Urinalysis    Collection Time: 08/29/21  7:10 PM   Result Value Ref Range    Color, UA Yellow Straw/Yellow    Clarity, UA Clear Clear    Glucose, Ur Negative Negative mg/dL    Bilirubin Urine Negative Negative    Ketones, Urine Negative Negative mg/dL    Specific Gravity, UA 1.020 1.005 - 1.030    Blood, Urine TRACE (A) Negative    pH, UA 6.0 5.0 - 9.0    Protein, UA 30 (A) Negative mg/dL    Urobilinogen, Urine 0.2 <2.0 E.U./dL    Nitrite, Urine Negative Negative    Leukocyte Esterase, Urine SMALL (A) Negative   Microscopic Urinalysis    Collection Time: 08/29/21  7:10 PM   Result Value Ref Range    WBC, UA 2-5 0 - 5 /HPF    RBC, UA 1-3 0 - 2 /HPF    Bacteria, UA MANY (A) None Seen /HPF   POCT Glucose    Collection Time: 08/30/21  3:39 AM   Result Value Ref Range    Meter Glucose 101 (H) 74 - 99 mg/dL   EKG 12 Lead    Collection Time: 08/30/21  4:14 AM   Result Value Ref Range    Ventricular Rate 50 BPM    Atrial Rate 50 BPM    P-R Interval 226 ms    QRS Duration 98 ms    Q-T Interval 436 ms    QTc Calculation (Bazett) 397 ms    P Axis 34 degrees    R Axis 38 degrees    T Axis 53 degrees   Hemoglobin A1C    Collection Time: 08/30/21  4:49 AM   Result Value Ref Range    Hemoglobin A1C 5.5 4.0 - 5.6 %   Troponin    Collection Time: 08/30/21  4:49 AM   Result Value Ref Range    Troponin, High Sensitivity 15 (H) 0 - 11 ng/L   Troponin    Collection Time: 08/30/21  4:49 AM   Result Value Ref Range    Troponin, High Sensitivity 14 (H) 0 - 11 ng/L   CK    Collection Time: 08/30/21  4:49 AM   Result Value Ref Range    Total CK 52 20 - 200 U/L   D-dimer, quantitative    Collection Time: 08/30/21  4:49 AM   Result Value Ref Range    D-Dimer, Quant 1386 ng/mL DDU   CK-MB    Collection Time: 08/30/21  4:49 AM   Result Value Ref Range    CK-MB 5.7 0.0 - 7.7 ng/mL   Lipase    Collection Time: 08/30/21  4:49 AM   Result Value Ref Range    Lipase 16 13 - 60 U/L   Basic Metabolic Panel w/ Reflex to MG    Collection Time: 08/30/21  4:49 AM   Result Value Ref Range    Sodium 136 132 - 146 mmol/L    Potassium reflex Magnesium 4.4 3.5 - 5.0 mmol/L    Chloride 105 98 - 107 mmol/L    CO2 19 (L) 22 - 29 mmol/L    Anion Gap 12 7 - 16 mmol/L    Glucose 97 74 - 99 mg/dL    BUN 25 (H) 6 - 23 mg/dL    CREATININE 1.5 (H) 0.7 - 1.2 mg/dL    GFR Non-African American 45 >=60 mL/min/1.73    GFR African American 54     Calcium 9.3 8.6 - 10.2 mg/dL   CBC auto differential    Collection Time: 08/30/21  4:49 AM   Result Value Ref Range    WBC 7.9 4.5 - 11.5 E9/L    RBC 4.90 3.80 - 5.80 E12/L    Hemoglobin 14.6 12.5 - 16.5 g/dL    Hematocrit 45.4 37.0 - 54.0 %    MCV 92.7 80.0 - 99.9 fL    MCH 29.8 26.0 - 35.0 pg    MCHC 32.2 32.0 - 34.5 %    RDW 14.8 11.5 - 15.0 fL    Platelets 278 (L) 324 - 450 E9/L    MPV 12.1 (H) 7.0 - 12.0 fL    Neutrophils % 78.0 43.0 - 80.0 %    Immature Granulocytes % 0.3 0.0 - 5.0 %    Lymphocytes % 12.5 (L) 20.0 - 42.0 %    Monocytes % 7.4 2.0 - 12.0 %    Eosinophils % 1.5 0.0 - 6.0 %    Basophils % 0.3 0.0 - 2.0 %    Neutrophils Absolute 6.14 1.80 - 7.30 E9/L    Immature Granulocytes # 0.02 E9/L    Lymphocytes Absolute 0.98 (L) 1.50 - 4.00 E9/L    Monocytes Absolute 0.58 0.10 - 0.95 E9/L    Eosinophils Absolute 0.12 0.05 - 0.50 E9/L    Basophils Absolute 0.02 0.00 - 0.20 E9/L   COVID-19, Rapid    Collection Time: 08/30/21  7:00 AM    Specimen: Nasopharyngeal Swab; Nasal   Result Value Ref Range    SARS-CoV-2, NAAT Not Detected Not Detected   ]     Current Facility-Administered Medications: cefTRIAXone (ROCEPHIN) 1,000 mg in sterile water 10 mL IV syringe, 1,000 mg, IntraVENous, Q24H  0.9 % sodium chloride infusion, , IntraVENous, Continuous  insulin lispro (HUMALOG) injection vial 0-10 Units, 0-10 Units, Subcutaneous, 4x Daily WC  glucose (GLUTOSE) 40 % oral gel 15 g, 15 g, Oral, PRN  dextrose 50 % IV solution, 12.5 g, IntraVENous, PRN  glucagon (rDNA) injection 1 mg, 1 mg, IntraMUSCular, PRN  dextrose 5 % solution, 100 mL/hr, IntraVENous, PRN  sodium chloride flush 0.9 % injection 5-40 mL, 5-40 mL, IntraVENous, 2 times per day  sodium chloride flush 0.9 % injection 5-40 mL, 5-40 mL, IntraVENous, PRN  0.9 % sodium chloride infusion, 25 mL, IntraVENous, PRN  ondansetron (ZOFRAN-ODT) disintegrating tablet 4 mg, 4 mg, Oral, Q8H PRN **OR** ondansetron (ZOFRAN) injection 4 mg, 4 mg, IntraVENous, Q6H PRN  acetaminophen (TYLENOL) tablet 650 mg, 650 mg, Oral, Q6H PRN **OR** acetaminophen (TYLENOL) suppository 650 mg, 650 mg, Rectal, Q6H PRN  polyethylene glycol (GLYCOLAX) packet 17 g, 17 g, Oral, Daily PRN  heparin (porcine) injection 5,000 Units, 5,000 Units, Subcutaneous, Q8H  morphine (PF) injection 2 mg, 2 mg, IntraVENous, Q4H PRN  busPIRone (BUSPAR) tablet 5 mg, 5 mg, Oral, BID  hydrALAZINE (APRESOLINE) injection 5 mg, 5 mg, IntraVENous, Q6H PRN  NIFEdipine (PROCARDIA XL) extended release tablet 30 mg, 30 mg, Oral, Daily  pantoprazole (PROTONIX) injection 40 mg, 40 mg, IntraVENous, Daily  promethazine (PHENERGAN) injection 12.5 mg, 12.5 mg, IntraMUSCular, Once   Hospital Problems Last Modified POA    UTI (urinary tract infection) 8/29/2021 Yes    Intractable nausea and vomiting 8/30/2021 Yes    Abdominal distention 8/30/2021 Yes    Acute kidney injury superimposed on CKD ([facility] [address] 8/30/2021 Yes    Essential hypertension 8/30/2021 Yes    Anxiety 8/30/2021 Yes    Type 2 diabetes mellitus with renal complication ([facility] 2/49/2629 Yes    BPH (benign prostatic hyperplasia) 8/30/2021 Yes    Dehydration, moderate 8/30/2021 Yes                  Assessment and Plan:     UTI, continue Rocephin waiting for culture   Urine retention post TURP, consult urologist   Nausea vomiting, IV fluid   Acute of chronic kidney disease, monitor   diabetes mellitus , insulin coverage    Hypertension, well controlled holding losartan   Continue DVT &  GERD prophylaxis     Culture shows gram-positive cocci, continue Rocephin      Electronically signed by Daniela Lagunas MD on 8/30/2021 at 7:59 AM

## 2021-08-31 LAB
ANION GAP SERPL CALCULATED.3IONS-SCNC: 9 MMOL/L (ref 7–16)
BASOPHILS ABSOLUTE: 0.03 E9/L (ref 0–0.2)
BASOPHILS RELATIVE PERCENT: 0.4 % (ref 0–2)
BUN BLDV-MCNC: 22 MG/DL (ref 6–23)
CALCIUM SERPL-MCNC: 8.6 MG/DL (ref 8.6–10.2)
CHLORIDE BLD-SCNC: 107 MMOL/L (ref 98–107)
CO2: 21 MMOL/L (ref 22–29)
CREAT SERPL-MCNC: 1.8 MG/DL (ref 0.7–1.2)
EOSINOPHILS ABSOLUTE: 0.47 E9/L (ref 0.05–0.5)
EOSINOPHILS RELATIVE PERCENT: 6.3 % (ref 0–6)
GFR AFRICAN AMERICAN: 44
GFR NON-AFRICAN AMERICAN: 36 ML/MIN/1.73
GLUCOSE BLD-MCNC: 80 MG/DL (ref 74–99)
HCT VFR BLD CALC: 42.4 % (ref 37–54)
HEMOGLOBIN: 13.8 G/DL (ref 12.5–16.5)
IMMATURE GRANULOCYTES #: 0.04 E9/L
IMMATURE GRANULOCYTES %: 0.5 % (ref 0–5)
LYMPHOCYTES ABSOLUTE: 1.14 E9/L (ref 1.5–4)
LYMPHOCYTES RELATIVE PERCENT: 15.3 % (ref 20–42)
MCH RBC QN AUTO: 29.9 PG (ref 26–35)
MCHC RBC AUTO-ENTMCNC: 32.5 % (ref 32–34.5)
MCV RBC AUTO: 92 FL (ref 80–99.9)
METER GLUCOSE: 113 MG/DL (ref 74–99)
METER GLUCOSE: 117 MG/DL (ref 74–99)
METER GLUCOSE: 82 MG/DL (ref 74–99)
MONOCYTES ABSOLUTE: 0.71 E9/L (ref 0.1–0.95)
MONOCYTES RELATIVE PERCENT: 9.5 % (ref 2–12)
NEUTROPHILS ABSOLUTE: 5.07 E9/L (ref 1.8–7.3)
NEUTROPHILS RELATIVE PERCENT: 68 % (ref 43–80)
PDW BLD-RTO: 14.8 FL (ref 11.5–15)
PLATELET # BLD: 124 E9/L (ref 130–450)
PMV BLD AUTO: 11.2 FL (ref 7–12)
POTASSIUM REFLEX MAGNESIUM: 4.4 MMOL/L (ref 3.5–5)
RBC # BLD: 4.61 E12/L (ref 3.8–5.8)
SODIUM BLD-SCNC: 137 MMOL/L (ref 132–146)
URINE CULTURE, ROUTINE: NORMAL
WBC # BLD: 7.5 E9/L (ref 4.5–11.5)

## 2021-08-31 PROCEDURE — C9113 INJ PANTOPRAZOLE SODIUM, VIA: HCPCS | Performed by: FAMILY MEDICINE

## 2021-08-31 PROCEDURE — 6370000000 HC RX 637 (ALT 250 FOR IP): Performed by: FAMILY MEDICINE

## 2021-08-31 PROCEDURE — 80048 BASIC METABOLIC PNL TOTAL CA: CPT

## 2021-08-31 PROCEDURE — 6370000000 HC RX 637 (ALT 250 FOR IP): Performed by: NURSE PRACTITIONER

## 2021-08-31 PROCEDURE — 6370000000 HC RX 637 (ALT 250 FOR IP): Performed by: STUDENT IN AN ORGANIZED HEALTH CARE EDUCATION/TRAINING PROGRAM

## 2021-08-31 PROCEDURE — 1200000000 HC SEMI PRIVATE

## 2021-08-31 PROCEDURE — 82962 GLUCOSE BLOOD TEST: CPT

## 2021-08-31 PROCEDURE — 2580000003 HC RX 258: Performed by: FAMILY MEDICINE

## 2021-08-31 PROCEDURE — 6360000002 HC RX W HCPCS: Performed by: FAMILY MEDICINE

## 2021-08-31 PROCEDURE — 36415 COLL VENOUS BLD VENIPUNCTURE: CPT

## 2021-08-31 PROCEDURE — 85025 COMPLETE CBC W/AUTO DIFF WBC: CPT

## 2021-08-31 RX ORDER — NIFEDIPINE 60 MG/1
60 TABLET, EXTENDED RELEASE ORAL DAILY
Status: DISCONTINUED | OUTPATIENT
Start: 2021-08-31 | End: 2021-09-03 | Stop reason: HOSPADM

## 2021-08-31 RX ADMIN — BUSPIRONE HYDROCHLORIDE 5 MG: 5 TABLET ORAL at 08:35

## 2021-08-31 RX ADMIN — PANTOPRAZOLE SODIUM 40 MG: 40 INJECTION, POWDER, FOR SOLUTION INTRAVENOUS at 08:35

## 2021-08-31 RX ADMIN — WATER 1000 MG: 1 INJECTION INTRAMUSCULAR; INTRAVENOUS; SUBCUTANEOUS at 21:15

## 2021-08-31 RX ADMIN — TAMSULOSIN HYDROCHLORIDE 0.4 MG: 0.4 CAPSULE ORAL at 08:35

## 2021-08-31 RX ADMIN — SODIUM CHLORIDE: 9 INJECTION, SOLUTION INTRAVENOUS at 08:38

## 2021-08-31 RX ADMIN — HEPARIN SODIUM 5000 UNITS: 10000 INJECTION INTRAVENOUS; SUBCUTANEOUS at 14:19

## 2021-08-31 RX ADMIN — BUSPIRONE HYDROCHLORIDE 5 MG: 5 TABLET ORAL at 21:15

## 2021-08-31 RX ADMIN — ACETAMINOPHEN 650 MG: 325 TABLET ORAL at 08:35

## 2021-08-31 RX ADMIN — HEPARIN SODIUM 5000 UNITS: 10000 INJECTION INTRAVENOUS; SUBCUTANEOUS at 05:46

## 2021-08-31 RX ADMIN — Medication 10 ML: at 21:15

## 2021-08-31 RX ADMIN — SODIUM CHLORIDE: 9 INJECTION, SOLUTION INTRAVENOUS at 17:42

## 2021-08-31 RX ADMIN — HEPARIN SODIUM 5000 UNITS: 10000 INJECTION INTRAVENOUS; SUBCUTANEOUS at 21:15

## 2021-08-31 RX ADMIN — NIFEDIPINE 60 MG: 60 TABLET, FILM COATED, EXTENDED RELEASE ORAL at 11:06

## 2021-08-31 ASSESSMENT — PAIN DESCRIPTION - LOCATION: LOCATION: SHOULDER

## 2021-08-31 ASSESSMENT — PAIN SCALES - GENERAL
PAINLEVEL_OUTOF10: 0
PAINLEVEL_OUTOF10: 3
PAINLEVEL_OUTOF10: 5
PAINLEVEL_OUTOF10: 4

## 2021-08-31 ASSESSMENT — PAIN DESCRIPTION - ORIENTATION: ORIENTATION: RIGHT

## 2021-08-31 ASSESSMENT — PAIN DESCRIPTION - DESCRIPTORS: DESCRIPTORS: PATIENT UNABLE TO DESCRIBE

## 2021-08-31 NOTE — PROGRESS NOTES
Hospitalist Progress Note      SYNOPSIS: Patient admitted on 2021 for abd pain, nausea and vomiting suspected 2/2 UTI s/p TURP      SUBJECTIVE:  Patient seen and examined at bedside in NAD. Pt complaining of lower abd fullness, but denies any fevers, chills, nausea, vomiting, abd pain, flank pain or diarrhea. De Leon draining dilute urine. Records reviewed. Stable overnight. No other overnight issues reported. Temp (24hrs), Av.3 °F (36.3 °C), Min:97.1 °F (36.2 °C), Max:97.5 °F (36.4 °C)    DIET: ADULT DIET; Regular  CODE: Full Code    Intake/Output Summary (Last 24 hours) at 2021 1437  Last data filed at 2021 1348  Gross per 24 hour   Intake 920 ml   Output 2800 ml   Net -1880 ml       OBJECTIVE:    BP (!) 146/71   Pulse 72   Temp 97.5 °F (36.4 °C) (Temporal)   Resp 16   Ht 5' 6\" (1.676 m)   SpO2 94%     General appearance: No apparent distress, appears stated age and cooperative. HEENT:  Conjunctivae/corneas clear. Neck: Supple. No jugular venous distention. Respiratory: Clear to auscultation bilaterally, normal respiratory effort  Cardiovascular: Regular rate rhythm, normal S1-S2  Abdomen: Soft, nontender, nondistended  Musculoskeletal: No clubbing, cyanosis, no bilateral lower extremity edema. Brisk capillary refill. 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  *     PLAN:  - Cont on empiric Ceftraixone  - Cx with NGTD  - Pt with urinary retention s/p TURP  - Urology c/s noted and appreciated; following  - Cont with de leon in interim per Urology recs  - Cont with IVF and encourage PO hydration  - Monitor renal function  - Cont with Lantus and SSI, POCT glucose  - Increase Procardia  - Cont to hole Losartan in setting of RIVER on CKD  - Monitor BP  - PRN hydralazine  - DVT and GERD PPX    DISPOSITION: Med/Surg    Medications:  REVIEWED DAILY    Infusion Medications    sodium chloride 100 mL/hr at 08/31/21 0838    dextrose      sodium chloride       Scheduled Medications    NIFEdipine  60 mg Oral Daily    cefTRIAXone (ROCEPHIN) IV  1,000 mg IntraVENous Q24H    insulin lispro  0-10 Units SubCUTAneous 4x Daily WC    sodium chloride flush  5-40 mL IntraVENous 2 times per day    heparin (porcine)  5,000 Units SubCUTAneous Q8H    busPIRone  5 mg Oral BID    pantoprazole  40 mg IntraVENous Daily    tamsulosin  0.4 mg Oral Daily    promethazine  12.5 mg IntraMUSCular Once     PRN Meds: glucose, dextrose, glucagon (rDNA), dextrose, sodium chloride flush, sodium chloride, ondansetron **OR** ondansetron, acetaminophen **OR** acetaminophen, polyethylene glycol, morphine, hydrALAZINE    Labs:     Recent Labs     08/29/21 1715 08/30/21 0449 08/31/21  0719   WBC 8.6 7.9 7.5   HGB 14.0 14.6 13.8   HCT 42.8 45.4 42.4    123* 124*       Recent Labs     08/29/21 1715 08/30/21  0449 08/31/21  0719    136 137   K 4.5 4.4 4.4    105 107   CO2 22 19* 21*   BUN 29* 25* 22   CREATININE 1.9* 1.5* 1.8*   CALCIUM 9.5 9.3 8.6       Recent Labs     08/29/21 1715 08/30/21  0449   PROT 7.5  --    ALKPHOS 97  --    ALT 12  --    AST 13  --    BILITOT 0.5  --    LIPASE 18 16       No results for input(s): INR in the last 72 hours.     Recent Labs     08/30/21 0449   CKTOTAL 52       Chronic labs:    Lab Results   Component Value Date    PSA 0.84 08/30/2021    LABA1C 5.5 08/30/2021       Radiology: REVIEWED DAILY    +++++++++++++++++++++++++++++++++++++++++++++++++  Farhat Ramsey MD  [facility] [place] Physician - [address] [place] [address] - [place], [place]  +++++++++++++++++++++++++++++++++++++++++++++++++  NOTE: This report was transcribed using voice recognition software. Every effort was made to ensure accuracy; however, inadvertent computerized transcription errors may be present.

## 2021-08-31 NOTE — CARE COORDINATION
Spoke with the pt's daughter, Katarzyna Hastings, per phone. The pt resides with her and will return home when medically stable. She will be providing transportation. The pt recently moved here from [address] and has not been established with a PCP.  William Serrato [phone]

## 2021-08-31 NOTE — PROGRESS NOTES
[facility] PROGRESS NOTE                                                                       8/31/2021          SUBJECTIVE:    Afebrile  Urine clearing  Creatinine 1.8  udrine culture  Mixed stacy  psa normal  OBJECTIVE:    BP (!) 146/71   Pulse 72   Temp 97.5 °F (36.4 °C) (Temporal)   Resp 16   Ht 5' 6\" (1.676 m)   SpO2 94%     PHYSICAL EXAMINATION:  Skin: dry, without rashes  Respirations: non-labored, intact  Abdomen: soft, non-tender, non-distended      Lab Results   Component Value Date    WBC 7.5 08/31/2021    HGB 13.8 08/31/2021    HCT 42.4 08/31/2021    MCV 92.0 08/31/2021     (L) 08/31/2021       Lab Results   Component Value Date    CREATININE 1.8 (H) 08/31/2021       Lab Results   Component Value Date    PSA 0.84 08/30/2021       REVIEW OF SYSTEMS:    CONSTITUTIONAL: negative  HEENT: negative  HEMATOLOGIC: negative  ENDOCRINE: negative  RESPIRATORY: negative  CV: negative  GI: negative  NEURO: negative  ORTHOPEDICS: negative  PSYCHIATRIC: negative  : as above    PAST FAMILY HISTORY:  No family history on file.   PAST SOCIAL HISTORY:    Social History     Socioeconomic History    Marital status:      Spouse name: Not on file    Number of children: Not on file    Years of education: Not on file    Highest education level: Not on file   Occupational History    Not on file   Tobacco Use    Smoking status: Not on file   Substance and Sexual Activity    Alcohol use: Not on file    Drug use: Not on file    Sexual activity: Not on file   Other Topics Concern    Not on file   Social History Narrative    Not on file     Social Determinants of Health     Financial Resource Strain:     Difficulty of Paying Living Expenses:    Food Insecurity:     Worried About Running Out of Food in the Last Year:     [address] in the Last Year:    Transportation Needs:     Lack of Transportation (Medical):      Lack of Transportation (Non-Medical):    Physical Activity:     Days of Exercise per Week:     Minutes of Exercise per Session:    Stress:     Feeling of Stress :    Social Connections:     Frequency of Communication with Friends and Family:     Frequency of Social Gatherings with Friends and Family:     Attends [employer] Services:     Active Member of Clubs or Organizations:     Attends Club or Organization Meetings:     Marital Status:    Intimate Partner Violence:     Fear of Current or Ex-Partner:     Emotionally Abused:     Physically Abused:     Sexually Abused:        Scheduled Meds:   NIFEdipine  60 mg Oral Daily    cefTRIAXone (ROCEPHIN) IV  1,000 mg IntraVENous Q24H    insulin lispro  0-10 Units SubCUTAneous 4x Daily WC    sodium chloride flush  5-40 mL IntraVENous 2 times per day    heparin (porcine)  5,000 Units SubCUTAneous Q8H    busPIRone  5 mg Oral BID    pantoprazole  40 mg IntraVENous Daily    tamsulosin  0.4 mg Oral Daily    promethazine  12.5 mg IntraMUSCular Once     Continuous Infusions:   sodium chloride 100 mL/hr at 08/31/21 1996    dextrose      sodium chloride       PRN Meds:.glucose, dextrose, glucagon (rDNA), dextrose, sodium chloride flush, sodium chloride, ondansetron **OR** ondansetron, acetaminophen **OR** acetaminophen, polyethylene glycol, morphine, hydrALAZINE    ASSESSMENT:    Patient Active Problem List   Diagnosis    UTI (urinary tract infection)    Intractable nausea and vomiting    Abdominal distention    Acute kidney injury superimposed on CKD ([facility]    Essential hypertension    Anxiety    Type 2 diabetes mellitus with renal complication ([facility]    BPH (benign prostatic hyperplasia)    Dehydration, moderate       PLAN:  Continue with de leon  TOV when stable        Daphizully Mora MD, M.D.  8/31/2021  1:56 PM

## 2021-09-01 ENCOUNTER — APPOINTMENT (OUTPATIENT)
Dept: GENERAL RADIOLOGY | Age: 81
DRG: 863 | End: 2021-09-01
Payer: MEDICARE

## 2021-09-01 ENCOUNTER — APPOINTMENT (OUTPATIENT)
Dept: CT IMAGING | Age: 81
DRG: 863 | End: 2021-09-01
Payer: MEDICARE

## 2021-09-01 LAB
ANION GAP SERPL CALCULATED.3IONS-SCNC: 9 MMOL/L (ref 7–16)
BASOPHILS ABSOLUTE: 0.05 E9/L (ref 0–0.2)
BASOPHILS RELATIVE PERCENT: 0.5 % (ref 0–2)
BUN BLDV-MCNC: 25 MG/DL (ref 6–23)
CALCIUM SERPL-MCNC: 8.5 MG/DL (ref 8.6–10.2)
CHLORIDE BLD-SCNC: 108 MMOL/L (ref 98–107)
CO2: 20 MMOL/L (ref 22–29)
CREAT SERPL-MCNC: 1.7 MG/DL (ref 0.7–1.2)
EOSINOPHILS ABSOLUTE: 0.48 E9/L (ref 0.05–0.5)
EOSINOPHILS RELATIVE PERCENT: 4.7 % (ref 0–6)
GFR AFRICAN AMERICAN: 47
GFR NON-AFRICAN AMERICAN: 39 ML/MIN/1.73
GLUCOSE BLD-MCNC: 122 MG/DL (ref 74–99)
HCT VFR BLD CALC: 44.4 % (ref 37–54)
HEMOGLOBIN: 14.2 G/DL (ref 12.5–16.5)
IMMATURE GRANULOCYTES #: 0.06 E9/L
IMMATURE GRANULOCYTES %: 0.6 % (ref 0–5)
LYMPHOCYTES ABSOLUTE: 0.9 E9/L (ref 1.5–4)
LYMPHOCYTES RELATIVE PERCENT: 8.8 % (ref 20–42)
MCH RBC QN AUTO: 29.7 PG (ref 26–35)
MCHC RBC AUTO-ENTMCNC: 32 % (ref 32–34.5)
MCV RBC AUTO: 92.9 FL (ref 80–99.9)
METER GLUCOSE: 120 MG/DL (ref 74–99)
METER GLUCOSE: 123 MG/DL (ref 74–99)
METER GLUCOSE: 144 MG/DL (ref 74–99)
MONOCYTES ABSOLUTE: 0.85 E9/L (ref 0.1–0.95)
MONOCYTES RELATIVE PERCENT: 8.3 % (ref 2–12)
NEUTROPHILS ABSOLUTE: 7.94 E9/L (ref 1.8–7.3)
NEUTROPHILS RELATIVE PERCENT: 77.1 % (ref 43–80)
PDW BLD-RTO: 14.4 FL (ref 11.5–15)
PLATELET # BLD: 126 E9/L (ref 130–450)
PMV BLD AUTO: 11.4 FL (ref 7–12)
POTASSIUM REFLEX MAGNESIUM: 4.2 MMOL/L (ref 3.5–5)
RBC # BLD: 4.78 E12/L (ref 3.8–5.8)
SODIUM BLD-SCNC: 137 MMOL/L (ref 132–146)
WBC # BLD: 10.3 E9/L (ref 4.5–11.5)

## 2021-09-01 PROCEDURE — 72125 CT NECK SPINE W/O DYE: CPT

## 2021-09-01 PROCEDURE — 36415 COLL VENOUS BLD VENIPUNCTURE: CPT

## 2021-09-01 PROCEDURE — 1200000000 HC SEMI PRIVATE

## 2021-09-01 PROCEDURE — 6360000002 HC RX W HCPCS: Performed by: FAMILY MEDICINE

## 2021-09-01 PROCEDURE — C9113 INJ PANTOPRAZOLE SODIUM, VIA: HCPCS | Performed by: FAMILY MEDICINE

## 2021-09-01 PROCEDURE — 6370000000 HC RX 637 (ALT 250 FOR IP): Performed by: NURSE PRACTITIONER

## 2021-09-01 PROCEDURE — 82962 GLUCOSE BLOOD TEST: CPT

## 2021-09-01 PROCEDURE — 73200 CT UPPER EXTREMITY W/O DYE: CPT

## 2021-09-01 PROCEDURE — 73030 X-RAY EXAM OF SHOULDER: CPT

## 2021-09-01 PROCEDURE — 2580000003 HC RX 258: Performed by: FAMILY MEDICINE

## 2021-09-01 PROCEDURE — 85025 COMPLETE CBC W/AUTO DIFF WBC: CPT

## 2021-09-01 PROCEDURE — 6370000000 HC RX 637 (ALT 250 FOR IP): Performed by: STUDENT IN AN ORGANIZED HEALTH CARE EDUCATION/TRAINING PROGRAM

## 2021-09-01 PROCEDURE — 80048 BASIC METABOLIC PNL TOTAL CA: CPT

## 2021-09-01 PROCEDURE — 6370000000 HC RX 637 (ALT 250 FOR IP): Performed by: FAMILY MEDICINE

## 2021-09-01 RX ORDER — OXYCODONE HYDROCHLORIDE AND ACETAMINOPHEN 5; 325 MG/1; MG/1
1 TABLET ORAL EVERY 6 HOURS PRN
Status: DISCONTINUED | OUTPATIENT
Start: 2021-09-01 | End: 2021-09-03 | Stop reason: HOSPADM

## 2021-09-01 RX ADMIN — BUSPIRONE HYDROCHLORIDE 5 MG: 5 TABLET ORAL at 08:44

## 2021-09-01 RX ADMIN — WATER 1000 MG: 1 INJECTION INTRAMUSCULAR; INTRAVENOUS; SUBCUTANEOUS at 22:58

## 2021-09-01 RX ADMIN — MORPHINE SULFATE 2 MG: 2 INJECTION, SOLUTION INTRAMUSCULAR; INTRAVENOUS at 06:33

## 2021-09-01 RX ADMIN — TAMSULOSIN HYDROCHLORIDE 0.4 MG: 0.4 CAPSULE ORAL at 08:43

## 2021-09-01 RX ADMIN — Medication 10 ML: at 09:05

## 2021-09-01 RX ADMIN — HEPARIN SODIUM 5000 UNITS: 10000 INJECTION INTRAVENOUS; SUBCUTANEOUS at 06:28

## 2021-09-01 RX ADMIN — OXYCODONE AND ACETAMINOPHEN 1 TABLET: 5; 325 TABLET ORAL at 08:47

## 2021-09-01 RX ADMIN — MORPHINE SULFATE 2 MG: 2 INJECTION, SOLUTION INTRAMUSCULAR; INTRAVENOUS at 12:31

## 2021-09-01 RX ADMIN — PANTOPRAZOLE SODIUM 40 MG: 40 INJECTION, POWDER, FOR SOLUTION INTRAVENOUS at 08:43

## 2021-09-01 RX ADMIN — BUSPIRONE HYDROCHLORIDE 5 MG: 5 TABLET ORAL at 22:53

## 2021-09-01 RX ADMIN — Medication 10 ML: at 22:59

## 2021-09-01 RX ADMIN — OXYCODONE AND ACETAMINOPHEN 1 TABLET: 5; 325 TABLET ORAL at 22:53

## 2021-09-01 RX ADMIN — NIFEDIPINE 60 MG: 60 TABLET, FILM COATED, EXTENDED RELEASE ORAL at 08:44

## 2021-09-01 RX ADMIN — HEPARIN SODIUM 5000 UNITS: 10000 INJECTION INTRAVENOUS; SUBCUTANEOUS at 22:56

## 2021-09-01 ASSESSMENT — PAIN SCALES - GENERAL
PAINLEVEL_OUTOF10: 3
PAINLEVEL_OUTOF10: 3
PAINLEVEL_OUTOF10: 8
PAINLEVEL_OUTOF10: 3
PAINLEVEL_OUTOF10: 9
PAINLEVEL_OUTOF10: 0
PAINLEVEL_OUTOF10: 10
PAINLEVEL_OUTOF10: 9
PAINLEVEL_OUTOF10: 0
PAINLEVEL_OUTOF10: 0

## 2021-09-01 ASSESSMENT — PAIN DESCRIPTION - LOCATION: LOCATION: SHOULDER

## 2021-09-01 ASSESSMENT — PAIN DESCRIPTION - FREQUENCY: FREQUENCY: INTERMITTENT

## 2021-09-01 ASSESSMENT — PAIN DESCRIPTION - DESCRIPTORS: DESCRIPTORS: PATIENT UNABLE TO DESCRIBE

## 2021-09-01 ASSESSMENT — PAIN DESCRIPTION - ORIENTATION: ORIENTATION: LEFT

## 2021-09-01 NOTE — PLAN OF CARE
Problem: Skin Integrity:  Goal: Will show no infection signs and symptoms  Description: Will show no infection signs and symptoms  Outcome: Ongoing     Problem: Skin Integrity:  Goal: Absence of new skin breakdown  Description: Absence of new skin breakdown  Outcome: Ongoing     Problem: Falls - Risk of:  Goal: Will remain free from falls  Description: Will remain free from falls  Outcome: Ongoing     Problem: Falls - Risk of:  Goal: Absence of physical injury  Description: Absence of physical injury  Outcome: Ongoing     Problem: Pain:  Goal: Pain level will decrease  Description: Pain level will decrease  Outcome: Ongoing

## 2021-09-01 NOTE — CARE COORDINATION
De Leon to be removed today and have voiding trail. If unable to void , de leon to be reinserted. Off unit for left shoulder Xray. Spoke with daughter to update. Patient lives with daughter and has a walker and wheelchair in the home. Daughter does not anticipate any additional needs. He uses [facility] on [address]. And needs a PCP arranged. CM/SW will continue to follow.

## 2021-09-01 NOTE — PROGRESS NOTES
Hospitalist Progress Note      SYNOPSIS: Patient admitted on 2021 for abd pain, nausea and vomiting suspected 2/2 UTI s/p TURP    SUBJECTIVE:  Patient seen and examined at bedside in moderate distress 2/2 new onset severe left shoulder pain which started overnight. Pt denies falling or sustaining any trauma. He is endorsing severe pain while trying to abduct, ROM is severely limited 2/2 pain. XR left shoulder without acute process. Pt otherwise denies any fevers, chills, nausea, vomiting, abd pain, flank pain or diarrhea. De Leon draining dilute urine. Plan to attempt de leon removal with TOV today per Urology recs. Records reviewed. No other overnight issues reported. Temp (24hrs), Av.7 °F (36.5 °C), Min:97.3 °F (36.3 °C), Max:98.3 °F (36.8 °C)    DIET: ADULT DIET; Regular  CODE: Full Code    Intake/Output Summary (Last 24 hours) at 2021 1325  Last data filed at 2021 0820  Gross per 24 hour   Intake 1532 ml   Output 3700 ml   Net -2168 ml     OBJECTIVE:  BP (!) 150/70   Pulse 76   Temp 98.3 °F (36.8 °C) (Temporal)   Resp 16   Ht 5' 6\" (1.676 m)   SpO2 96%   General appearance: Moderate distress, appears stated age and cooperative. HEENT:  Conjunctivae/corneas clear. Neck: Supple. No jugular venous distention. Respiratory: Clear to auscultation bilaterally, normal respiratory effort  Cardiovascular: Regular rate rhythm, normal S1-S2  Abdomen: Soft, nontender, non-distended. De Leon in place draining dilute urine. Musculoskeletal: No clubbing, cyanosis, no bilateral lower extremity edema. Brisk capillary refill. Limited left shoulder ROM, severe pain on minimal abduction of left arm. Point tenderness over anterior shoulder on palpation.   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  *     PLAN:  - Cont on empiric Ceftraixone  - Cx with NGTD  - Pt with urinary retention s/p TURP  - Urology c/s noted and appreciated; following  - Remove de leon today with TOV per Urology recs  - Cont with IVF and encourage PO hydration  - Monitor renal function  - Cont with Lantus and SSI, POCT glucose  - Titrate BP meds, Procardia 60mg PO QD and Hydralazine PRN  - Cont to hold Losartan in setting of RIVER on CKD; however renal function appears back to baseline  - Monitor BP  - Left shoulder XR without acute findings or fractures  - Pt endorsing significant left shoulder pain with decreased ROM, no reported on noted trauma/falls  - Consult Orthopedic Surgery  - DVT and GERD PPX    DISPOSITION: Med/Surg    Medications:  REVIEWED DAILY    Infusion Medications    sodium chloride 100 mL/hr at 08/31/21 1742    dextrose      sodium chloride       Scheduled Medications    NIFEdipine  60 mg Oral Daily    cefTRIAXone (ROCEPHIN) IV  1,000 mg IntraVENous Q24H    insulin lispro  0-10 Units SubCUTAneous 4x Daily WC    sodium chloride flush  5-40 mL IntraVENous 2 times per day    heparin (porcine)  5,000 Units SubCUTAneous Q8H    busPIRone  5 mg Oral BID    pantoprazole  40 mg IntraVENous Daily    tamsulosin  0.4 mg Oral Daily    promethazine  12.5 mg IntraMUSCular Once     PRN Meds: oxyCODONE-acetaminophen, glucose, dextrose, glucagon (rDNA), dextrose, sodium chloride flush, sodium chloride, ondansetron **OR** ondansetron, acetaminophen **OR** acetaminophen, polyethylene glycol, morphine, hydrALAZINE    Labs:     Recent Labs     08/30/21  0449 08/31/21  0719 09/01/21  0504   WBC 7.9 7.5 10.3   HGB 14.6 13.8 14.2   HCT 45.4 42.4 44.4   * 124* 126*       Recent Labs     08/30/21  0441 08/31/21  0719 09/01/21  0504    137 137   K 4.4 4.4 4.2    107 108*   CO2 19* 21* 20*   BUN 25* 22 25*   CREATININE 1.5* 1.8* 1.7*   CALCIUM 9.3 8.6 8.5*       Recent Labs     08/29/21  1715 08/30/21  0449   PROT 7.5  --    ALKPHOS 97  --    ALT 12  --    AST 13  --    BILITOT 0.5  --    LIPASE 18 16       No results for input(s): INR in the last 72 hours. Recent Labs     08/30/21  0449   CKTOTAL 52       Chronic labs:    Lab Results   Component Value Date    PSA 0.84 08/30/2021    LABA1C 5.5 08/30/2021       Radiology: REVIEWED DAILY    +++++++++++++++++++++++++++++++++++++++++++++++++  Luca Velasco MD  [employer] - [address] [place], [place]  +++++++++++++++++++++++++++++++++++++++++++++++++  NOTE: This report was transcribed using voice recognition software. Every effort was made to ensure accuracy; however, inadvertent computerized transcription errors may be present.

## 2021-09-01 NOTE — PROGRESS NOTES
Examined patient and discussed exam findings with attending. Formal consult note to follow.     Plan:  CT cervical spine WO contrast  CT shoulder WO contrast    Electronically signed by Megan Christensen DO on 9/1/2021 at 5:02 PM

## 2021-09-02 ENCOUNTER — APPOINTMENT (OUTPATIENT)
Dept: GENERAL RADIOLOGY | Age: 81
DRG: 863 | End: 2021-09-02
Payer: MEDICARE

## 2021-09-02 LAB
ANION GAP SERPL CALCULATED.3IONS-SCNC: 12 MMOL/L (ref 7–16)
BASOPHILS ABSOLUTE: 0.02 E9/L (ref 0–0.2)
BASOPHILS RELATIVE PERCENT: 0.2 % (ref 0–2)
BUN BLDV-MCNC: 20 MG/DL (ref 6–23)
C-REACTIVE PROTEIN: 13.7 MG/DL (ref 0–0.4)
CALCIUM SERPL-MCNC: 8.5 MG/DL (ref 8.6–10.2)
CHLORIDE BLD-SCNC: 102 MMOL/L (ref 98–107)
CO2: 19 MMOL/L (ref 22–29)
CREAT SERPL-MCNC: 1.7 MG/DL (ref 0.7–1.2)
EOSINOPHILS ABSOLUTE: 0.34 E9/L (ref 0.05–0.5)
EOSINOPHILS RELATIVE PERCENT: 3.5 % (ref 0–6)
GFR AFRICAN AMERICAN: 47
GFR NON-AFRICAN AMERICAN: 39 ML/MIN/1.73
GLUCOSE BLD-MCNC: 110 MG/DL (ref 74–99)
HCT VFR BLD CALC: 41.3 % (ref 37–54)
HEMOGLOBIN: 13.7 G/DL (ref 12.5–16.5)
IMMATURE GRANULOCYTES #: 0.1 E9/L
IMMATURE GRANULOCYTES %: 1 % (ref 0–5)
LYMPHOCYTES ABSOLUTE: 1 E9/L (ref 1.5–4)
LYMPHOCYTES RELATIVE PERCENT: 10.3 % (ref 20–42)
MCH RBC QN AUTO: 29.7 PG (ref 26–35)
MCHC RBC AUTO-ENTMCNC: 33.2 % (ref 32–34.5)
MCV RBC AUTO: 89.4 FL (ref 80–99.9)
METER GLUCOSE: 116 MG/DL (ref 74–99)
METER GLUCOSE: 127 MG/DL (ref 74–99)
METER GLUCOSE: 139 MG/DL (ref 74–99)
METER GLUCOSE: 146 MG/DL (ref 74–99)
MONOCYTES ABSOLUTE: 1.1 E9/L (ref 0.1–0.95)
MONOCYTES RELATIVE PERCENT: 11.3 % (ref 2–12)
NEUTROPHILS ABSOLUTE: 7.19 E9/L (ref 1.8–7.3)
NEUTROPHILS RELATIVE PERCENT: 73.7 % (ref 43–80)
PDW BLD-RTO: 14.8 FL (ref 11.5–15)
PLATELET # BLD: 114 E9/L (ref 130–450)
PMV BLD AUTO: 11.6 FL (ref 7–12)
POTASSIUM REFLEX MAGNESIUM: 4.2 MMOL/L (ref 3.5–5)
RBC # BLD: 4.62 E12/L (ref 3.8–5.8)
SEDIMENTATION RATE, ERYTHROCYTE: 25 MM/HR (ref 0–15)
SODIUM BLD-SCNC: 133 MMOL/L (ref 132–146)
WBC # BLD: 9.8 E9/L (ref 4.5–11.5)

## 2021-09-02 PROCEDURE — 85025 COMPLETE CBC W/AUTO DIFF WBC: CPT

## 2021-09-02 PROCEDURE — 6370000000 HC RX 637 (ALT 250 FOR IP): Performed by: FAMILY MEDICINE

## 2021-09-02 PROCEDURE — 2580000003 HC RX 258: Performed by: FAMILY MEDICINE

## 2021-09-02 PROCEDURE — C9113 INJ PANTOPRAZOLE SODIUM, VIA: HCPCS | Performed by: FAMILY MEDICINE

## 2021-09-02 PROCEDURE — 6370000000 HC RX 637 (ALT 250 FOR IP): Performed by: STUDENT IN AN ORGANIZED HEALTH CARE EDUCATION/TRAINING PROGRAM

## 2021-09-02 PROCEDURE — 82962 GLUCOSE BLOOD TEST: CPT

## 2021-09-02 PROCEDURE — 80048 BASIC METABOLIC PNL TOTAL CA: CPT

## 2021-09-02 PROCEDURE — 86140 C-REACTIVE PROTEIN: CPT

## 2021-09-02 PROCEDURE — 1200000000 HC SEMI PRIVATE

## 2021-09-02 PROCEDURE — 73630 X-RAY EXAM OF FOOT: CPT

## 2021-09-02 PROCEDURE — 85651 RBC SED RATE NONAUTOMATED: CPT

## 2021-09-02 PROCEDURE — 6360000002 HC RX W HCPCS: Performed by: FAMILY MEDICINE

## 2021-09-02 PROCEDURE — 36415 COLL VENOUS BLD VENIPUNCTURE: CPT

## 2021-09-02 PROCEDURE — 6370000000 HC RX 637 (ALT 250 FOR IP): Performed by: NURSE PRACTITIONER

## 2021-09-02 RX ADMIN — SODIUM CHLORIDE: 9 INJECTION, SOLUTION INTRAVENOUS at 22:06

## 2021-09-02 RX ADMIN — OXYCODONE AND ACETAMINOPHEN 1 TABLET: 5; 325 TABLET ORAL at 13:17

## 2021-09-02 RX ADMIN — Medication 10 ML: at 08:59

## 2021-09-02 RX ADMIN — HEPARIN SODIUM 5000 UNITS: 10000 INJECTION INTRAVENOUS; SUBCUTANEOUS at 06:05

## 2021-09-02 RX ADMIN — HEPARIN SODIUM 5000 UNITS: 10000 INJECTION INTRAVENOUS; SUBCUTANEOUS at 22:03

## 2021-09-02 RX ADMIN — NIFEDIPINE 60 MG: 60 TABLET, FILM COATED, EXTENDED RELEASE ORAL at 08:58

## 2021-09-02 RX ADMIN — HEPARIN SODIUM 5000 UNITS: 10000 INJECTION INTRAVENOUS; SUBCUTANEOUS at 13:18

## 2021-09-02 RX ADMIN — MORPHINE SULFATE 2 MG: 2 INJECTION, SOLUTION INTRAMUSCULAR; INTRAVENOUS at 08:58

## 2021-09-02 RX ADMIN — PANTOPRAZOLE SODIUM 40 MG: 40 INJECTION, POWDER, FOR SOLUTION INTRAVENOUS at 08:58

## 2021-09-02 RX ADMIN — Medication 10 ML: at 20:22

## 2021-09-02 RX ADMIN — BUSPIRONE HYDROCHLORIDE 5 MG: 5 TABLET ORAL at 08:58

## 2021-09-02 RX ADMIN — BUSPIRONE HYDROCHLORIDE 5 MG: 5 TABLET ORAL at 20:37

## 2021-09-02 RX ADMIN — WATER 1000 MG: 1 INJECTION INTRAMUSCULAR; INTRAVENOUS; SUBCUTANEOUS at 20:22

## 2021-09-02 RX ADMIN — ACETAMINOPHEN 650 MG: 325 TABLET ORAL at 08:58

## 2021-09-02 RX ADMIN — TAMSULOSIN HYDROCHLORIDE 0.4 MG: 0.4 CAPSULE ORAL at 08:58

## 2021-09-02 ASSESSMENT — PAIN SCALES - GENERAL
PAINLEVEL_OUTOF10: 3
PAINLEVEL_OUTOF10: 4
PAINLEVEL_OUTOF10: 8
PAINLEVEL_OUTOF10: 0
PAINLEVEL_OUTOF10: 8

## 2021-09-02 NOTE — PROGRESS NOTES
Hospitalist Progress Note      SYNOPSIS: Patient admitted on 2021 for abd pain, nausea and vomiting suspected 2/2 UTI s/p TURP    SUBJECTIVE:  Patient seen and examined at bedside in moderate distress 2/2 continued left shoulder pain and now right ankle pain. Pt denies falling or sustaining any trauma. ROM remains limited 2/2 pain. Pt otherwise denies any fevers, chills, nausea, vomiting, abd pain, flank pain or diarrhea. Duval removed and pt passed TOV. Records reviewed. No other overnight issues reported. Temp (24hrs), Av.1 °F (36.7 °C), Min:97.8 °F (36.6 °C), Max:98.7 °F (37.1 °C)    DIET: ADULT DIET; Regular  CODE: Full Code    Intake/Output Summary (Last 24 hours) at 2021 1343  Last data filed at 2021 1664  Gross per 24 hour   Intake 10 ml   Output 1350 ml   Net -1340 ml     OBJECTIVE:  BP (!) 149/69   Pulse 86   Temp 98.7 °F (37.1 °C) (Temporal)   Resp 16   Ht 5' 6\" (1.676 m)   SpO2 95%     General appearance: Moderate distress, appears stated age and cooperative. HEENT:  Conjunctivae/corneas clear. Neck: Supple. No jugular venous distention. Respiratory: Clear to auscultation bilaterally, normal respiratory effort  Cardiovascular: Regular rate rhythm, normal S1-S2  Abdomen: Soft, nontender, non-distended. Musculoskeletal: No clubbing, cyanosis, no bilateral lower extremity edema. Brisk capillary refill. Limited left shoulder and right ankle pain on passive soy active ROM. Point tenderness over anterior shoulder and right ankle, worse with manipulation and palption. No erythema noted, mild swelling of right ankle.   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 *     PLAN:  - Pt with urinary retention s/p TURP, de leon now removed and pt passed TOV  - Cont on empiric Ceftriaxone, transition to PO at discharge if still indicated  - Cx with NGTD  - Urology c/s noted and appreciated; following  - Decrease IVF and encourage PO hydration  - Monitor renal function  - Cont with Lantus and SSI, POCT glucose  - Titrate BP meds, Procardia 60mg PO QD and Hydralazine PRN  - Cont to hold Losartan in setting of RIVER on CKD; however renal function appears back to baseline  - Monitor BP  - Ortho c/s noted and appreciated; work-up for left shoulder and right ankle pain.   - Inflammatory markers and imaging order by ortho, cont to follow  - DVT and GERD PPX    DISPOSITION: Med/Surg    Medications:  REVIEWED DAILY    Infusion Medications    sodium chloride 100 mL/hr at 08/31/21 1742    dextrose      sodium chloride       Scheduled Medications    NIFEdipine  60 mg Oral Daily    cefTRIAXone (ROCEPHIN) IV  1,000 mg IntraVENous Q24H    insulin lispro  0-10 Units SubCUTAneous 4x Daily WC    sodium chloride flush  5-40 mL IntraVENous 2 times per day    heparin (porcine)  5,000 Units SubCUTAneous Q8H    busPIRone  5 mg Oral BID    pantoprazole  40 mg IntraVENous Daily    tamsulosin  0.4 mg Oral Daily    promethazine  12.5 mg IntraMUSCular Once     PRN Meds: oxyCODONE-acetaminophen, glucose, dextrose, glucagon (rDNA), dextrose, sodium chloride flush, sodium chloride, ondansetron **OR** ondansetron, acetaminophen **OR** acetaminophen, polyethylene glycol, morphine, hydrALAZINE    Labs:     Recent Labs     08/31/21  0719 09/01/21  0504 09/02/21  0622   WBC 7.5 10.3 9.8   HGB 13.8 14.2 13.7   HCT 42.4 44.4 41.3   * 126* 114*       Recent Labs     08/31/21  0719 09/01/21  0504 09/02/21  0622    137 133   K 4.4 4.2 4.2    108* 102   CO2 21* 20* 19*   BUN 22 25* 20

## 2021-09-02 NOTE — PROGRESS NOTES
Department of Orthopedic Surgery  Resident Progress Note    Patient seen and examined. Still having pain, less than yesterday. Now complaining of right foot pain on the sole of his foot. Describes the pain and burning and rates it a 10/10. Denies chest pain, shortness of breath, dizziness/lightheadedness. + Flatulence, + BM     VITALS:  BP (!) 149/69   Pulse 86   Temp 98.7 °F (37.1 °C) (Temporal)   Resp 16   Ht 5' 6\" (1.676 m)   SpO2 95%     General: alert and oriented to person, place and time, well-developed and well-nourished, in no acute distress    MUSCULOSKELETAL:   left upper extremity:  · Skin intact circumferentially, no ecchymosis, erythema, palpable masses  · Compartments soft and compressible  · +AIN/PIN/Ulnar/Median/Radial nerve function intact grossly  · +2/4 Radial pulse, Cap refill <2 sec  · Sensation intact to touch in radial/ulnar/median nerve distributions to hand  · Decreased passive external/internal rotation and forward flexion compared to the contralateral extremity    Right lower extremity  · Skin intact circumferentially, no erythema, ecchymosis  · Noticeable edema globally in the foot compared to the left.  No pretibial edema bilaterally  · Right foot is more cavovarus compared to the left  · TTP on the plantar aspect of the foot  · SILT saphenous, sural, tibial, deep and superficial peroneal nerve distributions  · Motor function grossly intact in tibial, deep and superficial nerve distributions  · +2/4 DP and PT pulses  · Compartments soft and compressible    CBC:   Lab Results   Component Value Date    WBC 9.8 09/02/2021    HGB 13.7 09/02/2021    HCT 41.3 09/02/2021     09/02/2021     PT/INR:  No results found for: PROTIME, INR      ASSESSMENT  · Left shoulder pain  · Gout vs psuedogout vs arthritis exacerbation vs septic arthritis    · Right foot pain  · Gout vs psuedogout vs charcot foot vs localized edema     PLAN      · WBAT RLE and LUE  · Deep venous thrombosis prophylaxis per primary team  · ESR and CRP ordered  · R foot xrays ordered  · PT/OT consult  · Pain Control per primary team   · D/C Plan:  Per primary team  · Will continue to follow  · Will discuss with attending    Electronically signed by Megan Christensen DO on 9/2/2021 at 9:46 AM

## 2021-09-02 NOTE — CONSULTS
Department of Orthopedic Surgery  Resident Consult Note          Reason for Consult:  Left shoulder pain    HISTORY OF PRESENT ILLNESS:       Patient is a Italian speaking 78 y.o. male who is a floor consult to orthopedic surgery. The patient was admitted for acute cystitis and urinary retention with associated nausea and vomiting. He is now currently complaining of atraumatic,  intractable left shoulder pain that is keeping him from being able to move his shoulder. While talking with the patient with the assistance of a  he states that his pain in centrally located over the shoulder joint but any motion at the shoulder joint causes pain into the trapezius and down into the elbow and hand. He describes the pain as sharp and stabby. He has not had any relief with percocet and it is exaggerated by any motion at the glenohumeral joint. He denies any previous injury, or surgery to this shoulder. He denies fevers, chills, chest pain, or SOB. Denies numbness/tingling/paresthesias. Denies any other orthopedic complaints at this time. Past Medical History:        Diagnosis Date    Pre-diabetes      Past Surgical History:    No past surgical history on file.   Current Medications:   Current Facility-Administered Medications: oxyCODONE-acetaminophen (PERCOCET) 5-325 MG per tablet 1 tablet, 1 tablet, Oral, Q6H PRN  NIFEdipine (PROCARDIA XL) extended release tablet 60 mg, 60 mg, Oral, Daily  cefTRIAXone (ROCEPHIN) 1,000 mg in sterile water 10 mL IV syringe, 1,000 mg, IntraVENous, Q24H  0.9 % sodium chloride infusion, , IntraVENous, Continuous  insulin lispro (HUMALOG) injection vial 0-10 Units, 0-10 Units, SubCUTAneous, 4x Daily WC  glucose (GLUTOSE) 40 % oral gel 15 g, 15 g, Oral, PRN  dextrose 50 % IV solution, 12.5 g, IntraVENous, PRN  glucagon (rDNA) injection 1 mg, 1 mg, IntraMUSCular, PRN  dextrose 5 % solution, 100 mL/hr, IntraVENous, PRN  sodium chloride flush 0.9 % injection 5-40 mL, 5-40 mL, IntraVENous, 2 times per day  sodium chloride flush 0.9 % injection 5-40 mL, 5-40 mL, IntraVENous, PRN  0.9 % sodium chloride infusion, 25 mL, IntraVENous, PRN  ondansetron (ZOFRAN-ODT) disintegrating tablet 4 mg, 4 mg, Oral, Q8H PRN **OR** ondansetron (ZOFRAN) injection 4 mg, 4 mg, IntraVENous, Q6H PRN  acetaminophen (TYLENOL) tablet 650 mg, 650 mg, Oral, Q6H PRN **OR** acetaminophen (TYLENOL) suppository 650 mg, 650 mg, Rectal, Q6H PRN  polyethylene glycol (GLYCOLAX) packet 17 g, 17 g, Oral, Daily PRN  heparin (porcine) injection 5,000 Units, 5,000 Units, SubCUTAneous, Q8H  morphine (PF) injection 2 mg, 2 mg, IntraVENous, Q4H PRN  busPIRone (BUSPAR) tablet 5 mg, 5 mg, Oral, BID  hydrALAZINE (APRESOLINE) injection 5 mg, 5 mg, IntraVENous, Q6H PRN  pantoprazole (PROTONIX) injection 40 mg, 40 mg, IntraVENous, Daily  tamsulosin (FLOMAX) capsule 0.4 mg, 0.4 mg, Oral, Daily  promethazine (PHENERGAN) injection 12.5 mg, 12.5 mg, IntraMUSCular, Once  Allergies:  Patient has no known allergies. Social History:   TOBACCO:   has no history on file for tobacco use. ETOH:   has no history on file for alcohol use. DRUGS:   has no history on file for drug use. ACTIVITIES OF DAILY LIVING:    OCCUPATION:    Family History:   No family history on file.     REVIEW OF SYSTEMS:  CONSTITUTIONAL:  negative for  fevers, chills  EYES:  negative for acute vision changes  HEENT:  negative for cough, hearing loss  RESPIRATORY:  negative for  dyspnea, wheezing  CARDIOVASCULAR:  negative for  chest pain  GASTROINTESTINAL:  negative for nausea, vomiting  GENITOURINARY:  negative for frequency, urinary incontinence  HEMATOLOGIC/LYMPHATIC:  negative for bleeding  MUSCULOSKELETAL:  positive for  pain and muscle weakness  NEUROLOGICAL:  negative for headaches, dizziness  BEHAVIOR/PSYCH:  negative for increased agitation and anxiety    PHYSICAL EXAM:    VITALS:  BP (!) 146/73   Pulse 76   Temp 97.8 °F (36.6 °C) (Infrared)   Resp 17   Ht 5' 6\" (1.676 m)   SpO2 93%   CONSTITUTIONAL:  awake, alert, cooperative, no apparent distress, and appears stated age  MUSCULOSKELETAL:  Left upper Extremity:  · Skin intact circumferentially with no associated ecchymosis, erythema or abrasians  · TTP grossly about the shoulder, the trapezius and posterior neck, both paraspinal and midline  · Sensation intact to light touch distally in radial, ulnar, median nerve distribution  · Motor function grossly intact distally in AIN, PIN, radial, ulnar nerve distributions  · Compartments soft and compressible  · +2/4 radial ulnar pulses  · Shoulder range of motion unable to be adequately assessed due to pain with even minor motions. · No appreciable effusion, mass, or induration   · Muscle strength decreased distally compared to the contralateral extremity, patient states this is due to pain. Secondary Exam:   · rightUE: No obvious signs of trauma. -TTP to fingers, hand, wrist, forearm, elbow, humerus, shoulder or clavicle. · bilateralLE: No obvious signs of trauma. -TTP to foot, ankle, leg, knee, thigh, hip. · Pelvis: -TTP, -Log roll, -Heel strike     DATA:    CBC:   Lab Results   Component Value Date    WBC 10.3 09/01/2021    RBC 4.78 09/01/2021    HGB 14.2 09/01/2021    HCT 44.4 09/01/2021    MCV 92.9 09/01/2021    MCH 29.7 09/01/2021    MCHC 32.0 09/01/2021    RDW 14.4 09/01/2021     09/01/2021    MPV 11.4 09/01/2021     PT/INR:  No results found for: PROTIME, INR    Radiology Review:  CT of the cervical spine ordered, no acute abnormalities. No associated fractures or dislocations. CT of the left shoulder ordered, results pending. IMPRESSION:  · Atraumatic left shoulder pain    PLAN:  · WBAT LUE  · Will plan to re-evaluate tomorrow  · Pain control per primary team  · DVT Prophylaxis per primary team  · All patient/family questions have been answered and patient is currently agreeable to plan.   · Discuss with Attending  Javier Barker      Electronically signed by Shasha Dennis DO on 9/2/2021 at 12:00 AM

## 2021-09-03 VITALS
WEIGHT: 195 LBS | SYSTOLIC BLOOD PRESSURE: 136 MMHG | HEIGHT: 66 IN | DIASTOLIC BLOOD PRESSURE: 64 MMHG | RESPIRATION RATE: 18 BRPM | BODY MASS INDEX: 31.34 KG/M2 | HEART RATE: 74 BPM | TEMPERATURE: 97.6 F | OXYGEN SATURATION: 94 %

## 2021-09-03 LAB
ANION GAP SERPL CALCULATED.3IONS-SCNC: 12 MMOL/L (ref 7–16)
BASOPHILS ABSOLUTE: 0.04 E9/L (ref 0–0.2)
BASOPHILS RELATIVE PERCENT: 0.4 % (ref 0–2)
BUN BLDV-MCNC: 27 MG/DL (ref 6–23)
CALCIUM SERPL-MCNC: 8.6 MG/DL (ref 8.6–10.2)
CHLORIDE BLD-SCNC: 104 MMOL/L (ref 98–107)
CO2: 19 MMOL/L (ref 22–29)
CREAT SERPL-MCNC: 2.1 MG/DL (ref 0.7–1.2)
EOSINOPHILS ABSOLUTE: 0.43 E9/L (ref 0.05–0.5)
EOSINOPHILS RELATIVE PERCENT: 4.1 % (ref 0–6)
GFR AFRICAN AMERICAN: 37
GFR NON-AFRICAN AMERICAN: 30 ML/MIN/1.73
GLUCOSE BLD-MCNC: 132 MG/DL (ref 74–99)
HCT VFR BLD CALC: 39.7 % (ref 37–54)
HEMOGLOBIN: 13.3 G/DL (ref 12.5–16.5)
IMMATURE GRANULOCYTES #: 0.11 E9/L
IMMATURE GRANULOCYTES %: 1.1 % (ref 0–5)
LYMPHOCYTES ABSOLUTE: 1.17 E9/L (ref 1.5–4)
LYMPHOCYTES RELATIVE PERCENT: 11.2 % (ref 20–42)
MCH RBC QN AUTO: 30.2 PG (ref 26–35)
MCHC RBC AUTO-ENTMCNC: 33.5 % (ref 32–34.5)
MCV RBC AUTO: 90.2 FL (ref 80–99.9)
METER GLUCOSE: 118 MG/DL (ref 74–99)
METER GLUCOSE: 180 MG/DL (ref 74–99)
MONOCYTES ABSOLUTE: 0.88 E9/L (ref 0.1–0.95)
MONOCYTES RELATIVE PERCENT: 8.4 % (ref 2–12)
NEUTROPHILS ABSOLUTE: 7.84 E9/L (ref 1.8–7.3)
NEUTROPHILS RELATIVE PERCENT: 74.8 % (ref 43–80)
PDW BLD-RTO: 15 FL (ref 11.5–15)
PLATELET # BLD: 139 E9/L (ref 130–450)
PMV BLD AUTO: 11.5 FL (ref 7–12)
POTASSIUM REFLEX MAGNESIUM: 4.1 MMOL/L (ref 3.5–5)
RBC # BLD: 4.4 E12/L (ref 3.8–5.8)
SODIUM BLD-SCNC: 135 MMOL/L (ref 132–146)
WBC # BLD: 10.5 E9/L (ref 4.5–11.5)

## 2021-09-03 PROCEDURE — 85025 COMPLETE CBC W/AUTO DIFF WBC: CPT

## 2021-09-03 PROCEDURE — 97530 THERAPEUTIC ACTIVITIES: CPT

## 2021-09-03 PROCEDURE — 82962 GLUCOSE BLOOD TEST: CPT

## 2021-09-03 PROCEDURE — C9113 INJ PANTOPRAZOLE SODIUM, VIA: HCPCS | Performed by: FAMILY MEDICINE

## 2021-09-03 PROCEDURE — 2580000003 HC RX 258: Performed by: FAMILY MEDICINE

## 2021-09-03 PROCEDURE — 6370000000 HC RX 637 (ALT 250 FOR IP): Performed by: STUDENT IN AN ORGANIZED HEALTH CARE EDUCATION/TRAINING PROGRAM

## 2021-09-03 PROCEDURE — 6370000000 HC RX 637 (ALT 250 FOR IP): Performed by: NURSE PRACTITIONER

## 2021-09-03 PROCEDURE — 6360000002 HC RX W HCPCS: Performed by: FAMILY MEDICINE

## 2021-09-03 PROCEDURE — 6370000000 HC RX 637 (ALT 250 FOR IP): Performed by: FAMILY MEDICINE

## 2021-09-03 PROCEDURE — 97161 PT EVAL LOW COMPLEX 20 MIN: CPT

## 2021-09-03 PROCEDURE — 97535 SELF CARE MNGMENT TRAINING: CPT

## 2021-09-03 PROCEDURE — 80048 BASIC METABOLIC PNL TOTAL CA: CPT

## 2021-09-03 PROCEDURE — 36415 COLL VENOUS BLD VENIPUNCTURE: CPT

## 2021-09-03 PROCEDURE — 97165 OT EVAL LOW COMPLEX 30 MIN: CPT

## 2021-09-03 RX ORDER — TAMSULOSIN HYDROCHLORIDE 0.4 MG/1
0.4 CAPSULE ORAL DAILY
Qty: 30 CAPSULE | Refills: 3 | Status: SHIPPED | OUTPATIENT
Start: 2021-09-04

## 2021-09-03 RX ORDER — NIFEDIPINE 60 MG/1
60 TABLET, FILM COATED, EXTENDED RELEASE ORAL DAILY
Qty: 30 TABLET | Refills: 3 | Status: SHIPPED | OUTPATIENT
Start: 2021-09-04

## 2021-09-03 RX ORDER — ACETAMINOPHEN 325 MG/1
650 TABLET ORAL EVERY 6 HOURS PRN
Qty: 30 TABLET | Refills: 1 | Status: SHIPPED | OUTPATIENT
Start: 2021-09-03

## 2021-09-03 RX ORDER — CEFDINIR 300 MG/1
300 CAPSULE ORAL 2 TIMES DAILY
Qty: 14 CAPSULE | Refills: 0 | Status: SHIPPED | OUTPATIENT
Start: 2021-09-03 | End: 2021-09-10

## 2021-09-03 RX ADMIN — HEPARIN SODIUM 5000 UNITS: 10000 INJECTION INTRAVENOUS; SUBCUTANEOUS at 05:59

## 2021-09-03 RX ADMIN — NIFEDIPINE 60 MG: 60 TABLET, FILM COATED, EXTENDED RELEASE ORAL at 09:12

## 2021-09-03 RX ADMIN — SODIUM CHLORIDE: 9 INJECTION, SOLUTION INTRAVENOUS at 11:38

## 2021-09-03 RX ADMIN — MORPHINE SULFATE 2 MG: 2 INJECTION, SOLUTION INTRAMUSCULAR; INTRAVENOUS at 09:12

## 2021-09-03 RX ADMIN — OXYCODONE AND ACETAMINOPHEN 1 TABLET: 5; 325 TABLET ORAL at 05:59

## 2021-09-03 RX ADMIN — PANTOPRAZOLE SODIUM 40 MG: 40 INJECTION, POWDER, FOR SOLUTION INTRAVENOUS at 09:12

## 2021-09-03 RX ADMIN — TAMSULOSIN HYDROCHLORIDE 0.4 MG: 0.4 CAPSULE ORAL at 09:12

## 2021-09-03 RX ADMIN — INSULIN LISPRO 2 UNITS: 100 INJECTION, SOLUTION INTRAVENOUS; SUBCUTANEOUS at 09:13

## 2021-09-03 RX ADMIN — BUSPIRONE HYDROCHLORIDE 5 MG: 5 TABLET ORAL at 09:12

## 2021-09-03 ASSESSMENT — PAIN SCALES - GENERAL
PAINLEVEL_OUTOF10: 0
PAINLEVEL_OUTOF10: 0
PAINLEVEL_OUTOF10: 7
PAINLEVEL_OUTOF10: 8

## 2021-09-03 ASSESSMENT — PAIN DESCRIPTION - LOCATION: LOCATION: SHOULDER

## 2021-09-03 ASSESSMENT — PAIN DESCRIPTION - ORIENTATION: ORIENTATION: LEFT

## 2021-09-03 ASSESSMENT — PAIN DESCRIPTION - DESCRIPTORS: DESCRIPTORS: ACHING;CONSTANT;DISCOMFORT

## 2021-09-03 ASSESSMENT — PAIN DESCRIPTION - PAIN TYPE: TYPE: ACUTE PAIN

## 2021-09-03 NOTE — PLAN OF CARE
Problem: Skin Integrity:  Goal: Will show no infection signs and symptoms  Description: Will show no infection signs and symptoms  Outcome: Ongoing  Goal: Absence of new skin breakdown  Description: Absence of new skin breakdown  Outcome: Ongoing     Problem: Falls - Risk of:  Goal: Will remain free from falls  Description: Will remain free from falls  Outcome: Ongoing  Goal: Absence of physical injury  Description: Absence of physical injury  Outcome: Ongoing     Problem: Pain:  Goal: Pain level will decrease  Description: Pain level will decrease  Outcome: Ongoing

## 2021-09-03 NOTE — PROGRESS NOTES
Department of Orthopedic Surgery  Resident Progress Note    Patient seen and examined.  used    Patient still complaining of pain about left shoulder that has been present for 5 days, no issues with respect to the shoulder prior to that. VITALS:  BP (!) 152/67   Pulse 77   Temp 98.4 °F (36.9 °C) (Temporal)   Resp 16   Ht 5' 6\" (1.676 m)   SpO2 95%     GENERAL: alert and awake  MUSCULOSKELETAL:   left upper extremity:  · Skin intact  · TTP globally over glenohumeral joint but with significant pain over rotator interval and posterior joint  · +pain with external rotation to 30 degrees and internal rotation to abdomen, abduction to 30 degrees  · Compartments soft and compressible  · +AIN/PIN/Ulnar nerve function intact grossly  · + Radial pulse, Brisk capillary refill  · Sensation intact to touch in radial/ulnar/median nerve distributions to hand    CBC:   Lab Results   Component Value Date    WBC 9.8 09/02/2021    HGB 13.7 09/02/2021    HCT 41.3 09/02/2021     09/02/2021       ASSESSMENT  · Adhesive capsulitis left shoulder    PLAN    · Continue current medical care  · Recommend PT/OT  · NSAIDS as able  · Likely plan for steroid injection at a later date once cystitis/UTI has resolved. Plan for follow up in office, please contact with any questions or concerns or if symptoms change/worsen    Procedure: In a sterile manner the left shoulder was prepped and aspirated from the posterior aspect. No synovial fluid was retrieved on the attempt. Patient tolerated procedure well.  was present for procedure virtually.

## 2021-09-03 NOTE — PROGRESS NOTES
[address]                                                  Patient Name: Mack Thomas    MRN: [id number]    : 1940    Room: [address] [place] [address]      Evaluating OT: Valentin Whitaker. Bhavin, OTR/L License #  [id number]       Referring Provider: Jessica Lee MD    Specific Provider Orders/Date: OT evaluation & treatment       Diagnosis: Adhesive capsulitis left shoulder, UTI   ? Gout vs psuedogout vs charcot foot vs localized edema     Surgery: s/p L shoulder aspiration 9-3-21  No past surgical history on file. Pertinent Medical History:  has a past medical history of Pre-diabetes. No past surgical history on file. Precautions:  Fall Risk, WBAT R LE & L UE, bed alarm/cognition, Kazakh speaking  Translation #: [id number] Torres Isaac     Assessment of current deficits    [] Functional mobility  [x]ADLs  [x] Strength               [x]Cognition    [x] Functional transfers   [x] IADLs         [x] Safety Awareness   [x]Endurance    [x] Fine Coordination              [x] Balance      [] Vision/perception   [x]Sensation     []Gross Motor Coordination  [] ROM  [] Delirium                   [] Motor Control     OT PLAN OF CARE   OT POC based on physician orders, patient diagnosis and results of clinical assessment    Frequency/Duration: 2-4 days/wk for 2 weeks PRN   Specific OT Treatment Interventions to include:    Instruction/training on adapted ADL techniques and AE recommendations to increase functional independence within precautions  Training on energy conservation strategies, correct breathing pattern and techniques to improve independence/tolerance for self-care routine  Functional transfer/mobility training/DME recommendations for increased independence, safety, and fall prevention  Patient/Family education to increase follow through with safety techniques and functional independence  Recommendation of environmental modifications for increased safety with functional transfers/mobility and ADLs  Cognitive retraining/development of therapeutic activities to improve problem solving, judgement, memory, and attention for increased safety/participation in ADL/IADL tasks  Therapeutic exercise to improve motor endurance, ROM, and functional strength for ADLs/functional transfers  Therapeutic activities to facilitate/challenge dynamic balance, stand tolerance for increased safety and independence with ADLs  Therapeutic activities to facilitate gross/fine motor skills for increased independence with ADLs  Positioning to improve skin integrity, interaction with environment and functional independence    Recommended Adaptive Equipment:  TBD     Home Living: Pt lives with wife & dtr. in a 1 story with 1 steps to enter with no HR. B&B on main level. Bathroom setup: walk in shower, std. commode   Equipment owned: ww, sp cane, shower chair    Prior Level of Function: Ind.?? with ADLs , family assist with IADLs; ambulated ww vs. Sp cane  Driving: active ?? Occupation: none stated    Pain Level: generalized + R foot; moderate to max. \"a lot\" with ax. Cognition: A&O: 3/4 (\"[place] the 3rd or 4th of the month\" unable to state year; Follows 1-2 step directions   Memory:  Fair-   Sequencing:  Fair-   Problem solving:  Fair-   Judgement/safety:  Fair-     Functional Assessment:  AM-PAC Daily Activity Raw Score: 12/24   Initial Eval Status  Date: 9-3-21 Treatment Status  Date: STGs = LTGs  Time frame: 10-14 days   Feeding Set up with cup to mouth     Grooming SBA  Supervision    UB Dressing Min A to doff gown seated EOB  Supervision    LB Dressing Max A/Dep  to patricia pants EOB, unable to pull past hips in standing, able to pull past hips with Max A in supine  Supervision    Bathing Dep with sim. task  Supervision    Toileting Dep, pt.  Incontinent of urine  Supervision    Bed Mobility  Supine to sit: Max A x2  Sit to supine: Max A x2   Supine to sit: Supervision   Sit to supine: Supervision    Functional Transfers Max A x2 with sit <> stand, 2 times, unable to achieve full stand with ww  Supervision    Functional Mobility NT  Supervision    Balance Sitting:     Static:  SBA    Dynamic:Min A  Standing: Max A x2     Activity Tolerance Poor+ d/t pain  Fair+ with mod. Ax. Visual/  Perceptual Glasses: WFL        Vitals WFL throughout  [facility]     Hand Dominance R   AROM (PROM) Strength Additional Info:    RUE  R shld. To 45  WFL distal Grossly 3-/5 shld. & elbow  3+/5 R wrist & hand good  and wfl FMC/dexterity noted during ADL tasks     LUE L shld. Minimal during ADLs  L elbow 20-90  Wrist WFL  Hand WFL L shld. . NT d/t recent aspiration  L elbow 3-/5  L wrist & hand 3+/5 good  and wfl FMC/dexterity noted during ADL tasks       Hearing: [facility]   Sensation:  No c/o numbness or tingling B UE  Tone: WFL   Edema: none noted B UE    Comments: Upon arrival patient supine, agreeable to OT, cleared by Nursing  Therapist facilitated bed mobility/ ADLs/ functional transfer training with focus on safety, technique & precautions. Pt. Instructed RE: safe transfers/mobility, ADLs, role of OT, treatment plan, recs. , prec. At end of session, patient supine, all needs met, RN notified, with call light and phone within reach, all lines and tubes intact. Overall patient demonstrated decreased strength, balance, independence & safety during completion of ADL/functional transfer/mobility tasks. Pt would benefit from continued skilled OT to increase safety and independence with completion of ADL/IADL tasks for functional independence and quality of life.     Treatment: OT treatment provided this date includes:    Instruction/training on safety and adapted techniques for completion of ADLs: to increase Tift in self care    Instruction/training on safe functional mobility/transfer techniques: with focus on safety, technique & precautions    Proper Positioning/Alignment: for optimal healing, skin integrity to prevent breakdown, decrease edema   Skilled monitoring of vitals: to include BP, spO2 & HR during session   Sitting/standing Balance/Tolerance- to increased balance & activity tolerance during ADLs as well as facilitate proper posture and/or positioning. Rehab Potential: Good for established goals     Patient / Family Goal: to return home     Patient and/or family were instructed on functional diagnosis, prognosis/goals and OT plan of care. Demonstrated fair+ understanding. Eval Complexity: Low    Time In: 8:05  Time Out: 8:30  Total Treatment Time: 10    Min Units   OT Eval Low 86009  x     OT Eval Medium 43433      OT Eval High 91838      OT Re-Eval [id number]       Therapeutic Ex 25654       Therapeutic Activities 70659       ADL/Self Care 51852  10  1   Orthotic Management 87997       Manual 68076     Neuro Re-Ed 92042       Non-Billable Time          Evaluation Time additionally includes thorough review of current medical information, gathering information on past medical history/social history and prior level of function, interpretation of standardized testing/informal observation of tasks, assessment of data and development of plan of care and goals. Jannet Delcid, OTR/L   License #  [id number]

## 2021-09-03 NOTE — PROGRESS NOTES
Physical Therapy  Physical Therapy Initial Assessment     Name: Nell Vaz  : 1940  MRN: [id number]      Date of Service: 9/3/2021    Evaluating PT:  Nickolas Duarte, PT [id number]      Room #:  5407/5407-B  Diagnosis:  Urinary retention [R33.9]  UTI (urinary tract infection) [N39.0]  Acute cystitis without hematuria [N30.00]  PMHx/PSHx:     has no past surgical history on file. has no past surgical history on file. Procedure/Surgery:  none  Precautions:  Falls,  FWB (full weight bearing) BUE and BLE, bed alarm, Guamanian only speaking. Equipment Needs:  Wheeled Walker,    SUBJECTIVE:    Patient lives with family wife and daughter  in a ranch home  with 1 step to enter with 1Rail  Bed is on 1 floor and bath is on 1 floor. Patient ambulated with wheeled walker  PTA. ??? Equipment owned: [employer],     session #[id number] Chris Dominguez    OBJECTIVE:   Initial Evaluation  Date: 9/3/21 Treatment Short Term/ Long Term   Goals   AM-PAC 6 Clicks 87/40     Was pt agreeable to Eval/treatment? Yes      Does pt have pain? Yes severe R foot and toe to touch. Also pain noted L and R shoulder. Also states he hurts all over. Bed Mobility  Rolling: Max   Supine to sit: Max x 2  Sit to supine: Max x 2  Scooting: Max   Rolling: Mod   Supine to sit: Mod   Sit to supine: Mod   Scooting: Mod    Transfers Sit to stand: Max x 2 with fww poor ability to stand. Stand to sit: Max   Stand pivot: NT with fww at this time unable to bear weight RLE to advance LE. Sit to stand: Mod    Stand to sit: Mod    Stand pivot: Mod     Ambulation    NT  25 feet with Mod  fww   Stair negotiation: ascended and descended  NT  2 steps with Mod     ROM BUE:  Defer to OT eval  BLE:  wfl     Strength BUE: Defer to OT eval  RLE:  4-/5  LLE:   4-/5  4/5   Balance Sitting EOB:  SBA  Dynamic Standing: Max A x 2  Sitting EOB:  Ind  Dynamic Standing:   Mod with fww     Patient is Alert & Oriented x person, place, time and family. Prognosis is  good for reaching above PT goals. Patient and or family understand(s) diagnosis, prognosis, and plan of care. yes    PHYSICAL THERAPY PLAN OF CARE:    PT POC is established based on physician order and patient diagnosis     Referring provider/PT Order:    09/02/21 0930  PT eval and qiana Ann MD       Diagnosis:  Urinary retention [R33.9]  UTI (urinary tract infection) [N39.0]  Acute cystitis without hematuria [N30.00]  Specific instructions for next treatment:  Transfer to bedside chair and Increase ambulation distance  Current Treatment Recommendations:     [x] Strengthening to improve independence with functional mobility   [x] ROM to improve independence with functional mobility   [x] Balance Training to improve static/dynamic balance and to reduce fall risk  [x] Endurance Training to improve activity tolerance during functional mobility   [x] Transfer Training to improve safety and independence with all functional transfers   [x] Gait Training to improve gait mechanics, endurance and asses need for appropriate assistive device  [x] Stair Training in preparation for safe discharge home and/or into the community   [] Positioning to prevent skin breakdown and contractures  [x] Safety and Education Training   [] Patient/Caregiver Education   [] HEP  [] Other     PT long term treatment goals are located in above grid    Frequency of treatments: 2-5x/week x 1-2 weeks. Time in  0805  Time out  0830    Total Treatment Time  25 minutes     Evaluation Time includes thorough review of current medical information, gathering information on past medical history/social history and prior level of function, completion of standardized testing/informal observation of tasks, assessment of data and education on plan of care and goals.     CPT codes:  [x] Low Complexity PT evaluation 07951  [] Moderate Complexity PT evaluation 96028  [] High Complexity PT evaluation 63899  [] PT Re-evaluation [id number]  [] Gait training 70058 - minutes  [] Manual therapy 57958 - minutes  [x] Therapeutic activities 15358 10 minutes  [] Therapeutic exercises 04087 - minutes  [] Neuromuscular reeducation 03015 - minutes     Bernie Age, [address] [facility]

## 2021-09-03 NOTE — PROGRESS NOTES
CLINICAL PHARMACY NOTE: MEDS TO BEDS    Total # of Prescriptions Filled: 3   The following medications were delivered to the patient:  · tamsulosin 0.4  · Cefdinir 300  · Nifedipine er osmotic 60     Additional Documentation:

## 2021-09-03 NOTE — DISCHARGE SUMMARY
Hospital Medicine Discharge Summary    Patient ID: Iwona Figueredo      Patient's PCP: No primary care provider on file. Admit Date: 8/29/2021     Discharge Date:   9/3/2021    Admitting Physician: Bennett Osler, MD     Discharge Physician: Ying Saunders MD     Discharge Diagnoses: Active Hospital Problems    Diagnosis Date Noted    Intractable nausea and vomiting [R11.2] 08/30/2021    Abdominal distention [R14.0] 08/30/2021    Acute kidney injury superimposed on CKD ([facility] [address] [N17.9, N18.9] 08/30/2021    Essential hypertension [I10] 08/30/2021    Anxiety [F41.9] 08/30/2021    Type 2 diabetes mellitus with renal complication ([facility] [address] [P11.26] 08/30/2021    BPH (benign prostatic hyperplasia) [N40.0] 08/30/2021    Dehydration, moderate [E86.0] 08/30/2021    UTI (urinary tract infection) [N39.0] 08/29/2021       The patient was seen and examined on day of discharge and this discharge summary is in conjunction with any daily progress note from day of discharge. Hospital Course: Patient is an 79 y/o male with a PMH as outlined including diabetes mellitus/prediabets, BPH s/p TURP, hypertension, gout, CKD  and anxiety disorder. He was admitted via the ED with a complaint of abdominal pain. , with associated abdominal distension, nausea and vomiting. CT of the abdomen and pelvis showed normal pancreas and gallbladder, but showed distended gallbladder with postop TURP changes, and bladder wall thickening with bladder diverticuli and colonic diverticulosis. Urinalysis showed evidence of UTI. He was admitted and managed for UTI and urinary retention. He was started on IV ceftriaxone. Urology was consulted. 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 CR trended down to 2.1. He had a de leon catheter inserted, which was eventually removed. Patient's urine culture grew mixed stacy. He remained stable and was discharged on 9/3/2021 with a script for PO cefdinir 300mg bid x 7 days.  He is to establish care with a PCP and follow up in 1-2 weeks. Hospital course was also complicated by right shoulder pain, and orthopedics was consulted to see him. He was diagnosed with adhesive capsulitis and per orthopedics, he is to follow up with orthopedics on outpatient basis for a shoulder steroid injection. Patient seen and examined. He was spoken to through a [place] . He had no complaints, and review of systems was otherwise negative. Labs and vitals reviewed, home medications reviewed and reconciled. Patient was clinically stable at time of discharge. Exam:     /64   Pulse 74   Temp 97.6 °F (36.4 °C) (Temporal)   Resp 18   Ht 5' 6\" (1.676 m)   SpO2 94%     General appearance: No apparent distress, appears stated age and cooperative. HEENT: Pupils equal, round, and reactive to light. Conjunctivae/corneas clear. Neck: Supple, with full range of motion. No jugular venous distention. Trachea midline. Respiratory:  Normal respiratory effort. Clear to auscultation, bilaterally without Rales/Wheezes/Rhonchi. Cardiovascular: Regular rate and rhythm with normal S1/S2 without murmurs, rubs or gallops. Abdomen: Soft, non-tender, non-distended with normal bowel sounds. Musculoskeletal: No clubbing, cyanosis or edema bilaterally. Full range of motion without deformity. Skin: Skin color, texture, turgor normal.  No rashes or lesions. Neurologic:  Neurovascularly intact without any focal sensory/motor deficits. Cranial nerves: II-XII intact, grossly non-focal.  Psychiatric: Alert and oriented, thought content appropriate, normal insight      Consults:     IP CONSULT TO INTERNAL MEDICINE  IP CONSULT TO UROLOGY  IP CONSULT TO ORTHOPEDIC SURGERY    Significant Diagnostic Studies:    XR FOOT RIGHT (MIN 3 VIEWS)   Final Result   No acute fractures or dislocations in the right foot. CT CERVICAL SPINE WO CONTRAST   Final Result   No acute abnormality of the cervical spine.          CT 7 days  Qty: 14 capsule, Refills: 0      acetaminophen (TYLENOL) 325 MG tablet Take 2 tablets by mouth every 6 hours as needed for Pain  Qty: 30 tablet, Refills: 1             Time Spent on discharge is more than 45 minutes in the examination, evaluation, counseling and review of medications and discharge plan.       Signed:    Wilman Power MD   9/3/2021

## 2021-09-03 NOTE — PROGRESS NOTES
Hospitalist Progress Note      SYNOPSIS: Patient admitted on 2021 for abdominal pain, nausea and vomiting. He is being managed for UTI as a result of TURP. SUBJECTIVE:    Patient seen and examined. He complains of burning sensation in his feet. His shoulder pain seems to be improving. Review of systems otherwise negative. Records reviewed. Review of systems done through an  as patient is only [place] speaking. Temp (24hrs), Av.4 °F (36.9 °C), Min:98.4 °F (36.9 °C), Max:98.4 °F (36.9 °C)    DIET: No diet orders on file  CODE: Full Code    Intake/Output Summary (Last 24 hours) at 9/3/2021 1200  Last data filed at 9/3/2021 0904  Gross per 24 hour   Intake 1353 ml   Output 1250 ml   Net 103 ml       OBJECTIVE:    /60   Pulse 89   Temp 98.4 °F (36.9 °C) (Temporal)   Resp 16   Ht 5' 6\" (1.676 m)   SpO2 95%     General appearance: No apparent distress, appears stated age and cooperative. HEENT:  Conjunctivae/corneas clear. Neck: Supple. No jugular venous distention. Respiratory: Clear to auscultation bilaterally, normal respiratory effort  Cardiovascular: Regular rate rhythm, normal S1-S2  Abdomen: Soft, nontender, nondistended  Musculoskeletal: No clubbing, cyanosis, no bilateral lower extremity edema. Brisk capillary refill. Skin:  No rashes  on visible skin  Neurologic: awake, alert and following commands     ASSESSMENT:  #UTI  -this is after TURP. -on IV ceftriaxone. Urology on board    #Right shoulder pain due to adhesive capsulitis  -orthopedic surgery on board  -to follow up with orthopedic surgery on outpatient basis for a steroid shot. -PT/OT on board. Fall precautions.  -has had aspiration done from the left shoulder. #Hypertension  -on procardia and hydralazine. #Type 2 diabetes mellitus  -on lantus. ISS. Accuchecks ACHS    #RIVER: Cr is 2.1 today. Trended up from 1.7. Will trend. DVT prophylaxis: on heparin.         DISPOSITION: for likely discharge home tomorrow. Medications:  REVIEWED DAILY    Infusion Medications    sodium chloride 100 mL/hr at 09/03/21 1138    dextrose      sodium chloride       Scheduled Medications    NIFEdipine  60 mg Oral Daily    cefTRIAXone (ROCEPHIN) IV  1,000 mg IntraVENous Q24H    insulin lispro  0-10 Units SubCUTAneous 4x Daily WC    sodium chloride flush  5-40 mL IntraVENous 2 times per day    heparin (porcine)  5,000 Units SubCUTAneous Q8H    busPIRone  5 mg Oral BID    pantoprazole  40 mg IntraVENous Daily    tamsulosin  0.4 mg Oral Daily    promethazine  12.5 mg IntraMUSCular Once     PRN Meds: oxyCODONE-acetaminophen, glucose, dextrose, glucagon (rDNA), dextrose, sodium chloride flush, sodium chloride, ondansetron **OR** ondansetron, acetaminophen **OR** acetaminophen, polyethylene glycol, morphine, hydrALAZINE    Labs:     Recent Labs     09/01/21  0504 09/02/21  0622 09/03/21  0552   WBC 10.3 9.8 10.5   HGB 14.2 13.7 13.3   HCT 44.4 41.3 39.7   * 114* 139       Recent Labs     09/01/21  0504 09/02/21  0622 09/03/21  0551    133 135   K 4.2 4.2 4.1   * 102 104   CO2 20* 19* 19*   BUN 25* 20 27*   CREATININE 1.7* 1.7* 2.1*   CALCIUM 8.5* 8.5* 8.6       No results for input(s): PROT, ALB, ALKPHOS, ALT, AST, BILITOT, AMYLASE, LIPASE in the last 72 hours. No results for input(s): INR in the last 72 hours. No results for input(s): Edgardo Seed in the last 72 hours. Chronic labs:    Lab Results   Component Value Date    PSA 0.84 08/30/2021    LABA1C 5.5 08/30/2021       Radiology: REVIEWED DAILY    +++++++++++++++++++++++++++++++++++++++++++++++++  Claudean Dicker, MD  [facility] Physician - [address] [facility], [place]  +++++++++++++++++++++++++++++++++++++++++++++++++  NOTE: This report was transcribed using voice recognition software.  Every effort was made to ensure accuracy; however, inadvertent computerized transcription errors may be present.

## 2021-09-04 ENCOUNTER — CARE COORDINATION (OUTPATIENT)
Dept: CASE MANAGEMENT | Age: 81
End: 2021-09-04

## 2021-09-04 LAB
BLOOD CULTURE, ROUTINE: NORMAL
CULTURE, BLOOD 2: NORMAL

## 2021-09-04 NOTE — CARE COORDINATION
[address] Transitions Initial Follow Up Call    Call within 2 business days of discharge: Yes    Patient: Lashon Figueredo Patient : 1940   MRN: <[id number]>  Reason for Admission: ACUTE CYSTITIS WITHOUT HEMATURIA   Discharge Date: 9/3/21 RARS: Readmission Risk Score: 15      Last Discharge [facility]       Complaint Diagnosis Description Type Department Provider    21 Nausea & Vomiting Acute cystitis without hematuria . .. ED to Hosp-Admission (Discharged) (ADMITTED) ALFIE 5WE Quiana Machuca MD; Saratha Schaumann, D... Spoke with: Carla 84: [place]      Non-face-to-face services provided:  Obtained and reviewed discharge summary and/or continuity of care documents    Care Transitions 24 Hour Call    Schedule Follow Up Appointment with PCP: Declined  Do you have any ongoing symptoms?: No  Do you have a copy of your discharge instructions?: Yes  Do you have all of your prescriptions and are they filled?: Yes  Have you been contacted by a [facility] Pharmacist?: No  Have you scheduled your follow up appointment?: No  Were you discharged with any Home Care or Post Acute Services: No  Do you feel like you have everything you need to keep you well at home?: Yes  Care Transitions Interventions  No Identified Needs     Spoke with Riley's daughter Norah Hastings for initial BPCI care transition call post hospital discharge. Explained the role of Care Transition Nurse and the BPCI-A program, she is agreeable to follow up post discharge from the hospital. Norah Hastings reports that her dad is doing \"very good\" today. She stated he just finished showering and is about to eat breakfast. She denies any urinary complaints at all since returning home. Med review completed. Norah Hastings declined assistance with establishing with a new pcp. Norah Hastings denies any needs, questions, or concerns at this time.  Explained that a [facility] Navigator to follow for 30 days and CTN will resume contact after 30 day period has ended, she in agreement. Follow Up  No future appointments.     Janneth Tolentino RN

## 2021-09-28 PROBLEM — N39.0 UTI (URINARY TRACT INFECTION): Status: RESOLVED | Noted: 2021-08-29 | Resolved: 2021-09-28

## 2021-10-06 ENCOUNTER — CARE COORDINATION (OUTPATIENT)
Dept: CASE MANAGEMENT | Age: 81
End: 2021-10-06

## 2021-10-13 ENCOUNTER — CARE COORDINATION (OUTPATIENT)
Dept: CASE MANAGEMENT | Age: 81
End: 2021-10-13